# Patient Record
Sex: MALE | Race: BLACK OR AFRICAN AMERICAN | NOT HISPANIC OR LATINO | Employment: OTHER | URBAN - METROPOLITAN AREA
[De-identification: names, ages, dates, MRNs, and addresses within clinical notes are randomized per-mention and may not be internally consistent; named-entity substitution may affect disease eponyms.]

---

## 2019-01-29 ENCOUNTER — HOSPITAL ENCOUNTER (INPATIENT)
Facility: HOSPITAL | Age: 84
LOS: 2 days | Discharge: HOME | DRG: 291 | End: 2019-01-31
Attending: EMERGENCY MEDICINE | Admitting: EMERGENCY MEDICINE
Payer: MEDICARE

## 2019-01-29 DIAGNOSIS — I50.41 ACUTE COMBINED SYSTOLIC AND DIASTOLIC CONGESTIVE HEART FAILURE: ICD-10-CM

## 2019-01-29 DIAGNOSIS — I50.9 CHF (CONGESTIVE HEART FAILURE): ICD-10-CM

## 2019-01-29 DIAGNOSIS — R50.9 FEVER: ICD-10-CM

## 2019-01-29 DIAGNOSIS — R09.02 HYPOXIA: ICD-10-CM

## 2019-01-29 DIAGNOSIS — J18.9 PNEUMONIA OF BOTH LOWER LOBES DUE TO INFECTIOUS ORGANISM: ICD-10-CM

## 2019-01-29 DIAGNOSIS — I50.9 ACUTE CONGESTIVE HEART FAILURE, UNSPECIFIED HEART FAILURE TYPE: Primary | ICD-10-CM

## 2019-01-29 DIAGNOSIS — R06.02 SHORTNESS OF BREATH: ICD-10-CM

## 2019-01-29 DIAGNOSIS — J10.1 INFLUENZA A: ICD-10-CM

## 2019-01-29 PROBLEM — N40.0 BPH (BENIGN PROSTATIC HYPERPLASIA): Status: ACTIVE | Noted: 2019-01-29

## 2019-01-29 PROBLEM — J96.01 ACUTE RESPIRATORY FAILURE WITH HYPOXIA: Status: ACTIVE | Noted: 2019-01-29

## 2019-01-29 PROBLEM — I10 ESSENTIAL HYPERTENSION: Status: ACTIVE | Noted: 2019-01-29

## 2019-01-29 PROBLEM — N18.4 CKD (CHRONIC KIDNEY DISEASE) STAGE 4, GFR 15-29 ML/MIN: Status: ACTIVE | Noted: 2019-01-29

## 2019-01-29 PROBLEM — R33.9 URINARY RETENTION: Status: ACTIVE | Noted: 2019-01-29

## 2019-01-29 LAB
ALBUMIN SERPL BCP-MCNC: 3.4 G/DL
ALP SERPL-CCNC: 79 U/L
ALT SERPL W/O P-5'-P-CCNC: 11 U/L
ANION GAP SERPL CALC-SCNC: 11 MMOL/L
AST SERPL-CCNC: 34 U/L
BACTERIA #/AREA URNS HPF: NORMAL /HPF
BASOPHILS # BLD AUTO: 0.02 K/UL
BASOPHILS NFR BLD: 0.3 %
BILIRUB SERPL-MCNC: 1.3 MG/DL
BILIRUB UR QL STRIP: NEGATIVE
BNP SERPL-MCNC: 1101 PG/ML
BUN SERPL-MCNC: 21 MG/DL
CALCIUM SERPL-MCNC: 9.2 MG/DL
CHLORIDE SERPL-SCNC: 108 MMOL/L
CLARITY UR: CLEAR
CO2 SERPL-SCNC: 21 MMOL/L
COLOR UR: YELLOW
CREAT SERPL-MCNC: 2.4 MG/DL
DIFFERENTIAL METHOD: ABNORMAL
EOSINOPHIL # BLD AUTO: 0 K/UL
EOSINOPHIL NFR BLD: 0.3 %
ERYTHROCYTE [DISTWIDTH] IN BLOOD BY AUTOMATED COUNT: 14.1 %
EST. GFR  (AFRICAN AMERICAN): 28 ML/MIN/1.73 M^2
EST. GFR  (NON AFRICAN AMERICAN): 24 ML/MIN/1.73 M^2
GLUCOSE SERPL-MCNC: 205 MG/DL
GLUCOSE UR QL STRIP: NEGATIVE
HCT VFR BLD AUTO: 30.3 %
HGB BLD-MCNC: 10.3 G/DL
HGB UR QL STRIP: ABNORMAL
HYALINE CASTS #/AREA URNS LPF: 0 /LPF
INFLUENZA A, MOLECULAR: POSITIVE
INFLUENZA B, MOLECULAR: NEGATIVE
KETONES UR QL STRIP: NEGATIVE
LACTATE SERPL-SCNC: 0.8 MMOL/L
LACTATE SERPL-SCNC: 2.3 MMOL/L
LEUKOCYTE ESTERASE UR QL STRIP: NEGATIVE
LYMPHOCYTES # BLD AUTO: 0.5 K/UL
LYMPHOCYTES NFR BLD: 6.9 %
MCH RBC QN AUTO: 29.6 PG
MCHC RBC AUTO-ENTMCNC: 34 G/DL
MCV RBC AUTO: 87 FL
MICROSCOPIC COMMENT: NORMAL
MONOCYTES # BLD AUTO: 1.2 K/UL
MONOCYTES NFR BLD: 15.1 %
NEUTROPHILS # BLD AUTO: 6.1 K/UL
NEUTROPHILS NFR BLD: 77.4 %
NITRITE UR QL STRIP: NEGATIVE
PH UR STRIP: 6 [PH] (ref 5–8)
PLATELET # BLD AUTO: 183 K/UL
PMV BLD AUTO: 10.1 FL
POTASSIUM SERPL-SCNC: 4.1 MMOL/L
PROT SERPL-MCNC: 7.2 G/DL
PROT UR QL STRIP: ABNORMAL
RBC # BLD AUTO: 3.48 M/UL
RBC #/AREA URNS HPF: 1 /HPF (ref 0–4)
SODIUM SERPL-SCNC: 140 MMOL/L
SP GR UR STRIP: 1.01 (ref 1–1.03)
SPECIMEN SOURCE: ABNORMAL
SQUAMOUS #/AREA URNS HPF: 1 /HPF
URN SPEC COLLECT METH UR: ABNORMAL
UROBILINOGEN UR STRIP-ACNC: NEGATIVE EU/DL
WBC # BLD AUTO: 7.84 K/UL
WBC #/AREA URNS HPF: 1 /HPF (ref 0–5)

## 2019-01-29 PROCEDURE — 25000003 PHARM REV CODE 250: Performed by: PHYSICIAN ASSISTANT

## 2019-01-29 PROCEDURE — 96361 HYDRATE IV INFUSION ADD-ON: CPT

## 2019-01-29 PROCEDURE — 87040 BLOOD CULTURE FOR BACTERIA: CPT

## 2019-01-29 PROCEDURE — 96365 THER/PROPH/DIAG IV INF INIT: CPT

## 2019-01-29 PROCEDURE — 80053 COMPREHEN METABOLIC PANEL: CPT

## 2019-01-29 PROCEDURE — 63600175 PHARM REV CODE 636 W HCPCS: Performed by: NURSE PRACTITIONER

## 2019-01-29 PROCEDURE — 25000242 PHARM REV CODE 250 ALT 637 W/ HCPCS: Performed by: NURSE PRACTITIONER

## 2019-01-29 PROCEDURE — 96375 TX/PRO/DX INJ NEW DRUG ADDON: CPT

## 2019-01-29 PROCEDURE — 87502 INFLUENZA DNA AMP PROBE: CPT

## 2019-01-29 PROCEDURE — 83880 ASSAY OF NATRIURETIC PEPTIDE: CPT

## 2019-01-29 PROCEDURE — 25000003 PHARM REV CODE 250: Performed by: NURSE PRACTITIONER

## 2019-01-29 PROCEDURE — 94640 AIRWAY INHALATION TREATMENT: CPT

## 2019-01-29 PROCEDURE — 81000 URINALYSIS NONAUTO W/SCOPE: CPT

## 2019-01-29 PROCEDURE — 63600175 PHARM REV CODE 636 W HCPCS: Performed by: EMERGENCY MEDICINE

## 2019-01-29 PROCEDURE — 21400001 HC TELEMETRY ROOM

## 2019-01-29 PROCEDURE — 85025 COMPLETE CBC W/AUTO DIFF WBC: CPT

## 2019-01-29 PROCEDURE — 83605 ASSAY OF LACTIC ACID: CPT | Mod: 91

## 2019-01-29 PROCEDURE — 99291 CRITICAL CARE FIRST HOUR: CPT | Mod: 25

## 2019-01-29 PROCEDURE — 93010 EKG 12-LEAD: ICD-10-PCS | Mod: ,,, | Performed by: INTERNAL MEDICINE

## 2019-01-29 PROCEDURE — 36415 COLL VENOUS BLD VENIPUNCTURE: CPT

## 2019-01-29 PROCEDURE — 51702 INSERT TEMP BLADDER CATH: CPT

## 2019-01-29 PROCEDURE — 93010 ELECTROCARDIOGRAM REPORT: CPT | Mod: ,,, | Performed by: INTERNAL MEDICINE

## 2019-01-29 PROCEDURE — 93005 ELECTROCARDIOGRAM TRACING: CPT

## 2019-01-29 PROCEDURE — 25000003 PHARM REV CODE 250: Performed by: EMERGENCY MEDICINE

## 2019-01-29 RX ORDER — VANCOMYCIN HCL IN 5 % DEXTROSE 1.5G/250ML
1500 PLASTIC BAG, INJECTION (ML) INTRAVENOUS
Status: COMPLETED | OUTPATIENT
Start: 2019-01-29 | End: 2019-01-29

## 2019-01-29 RX ORDER — ONDANSETRON 2 MG/ML
4 INJECTION INTRAMUSCULAR; INTRAVENOUS EVERY 12 HOURS PRN
Status: DISCONTINUED | OUTPATIENT
Start: 2019-01-29 | End: 2019-01-31 | Stop reason: HOSPADM

## 2019-01-29 RX ORDER — TAMSULOSIN HYDROCHLORIDE 0.4 MG/1
0.4 CAPSULE ORAL DAILY
COMMUNITY

## 2019-01-29 RX ORDER — NAPROXEN SODIUM 220 MG/1
81 TABLET, FILM COATED ORAL DAILY
Status: DISCONTINUED | OUTPATIENT
Start: 2019-01-30 | End: 2019-01-31 | Stop reason: HOSPADM

## 2019-01-29 RX ORDER — OSELTAMIVIR PHOSPHATE 6 MG/ML
30 FOR SUSPENSION ORAL 2 TIMES DAILY
Status: DISCONTINUED | OUTPATIENT
Start: 2019-01-29 | End: 2019-01-31 | Stop reason: HOSPADM

## 2019-01-29 RX ORDER — TAMSULOSIN HYDROCHLORIDE 0.4 MG/1
0.4 CAPSULE ORAL 2 TIMES DAILY
Status: DISCONTINUED | OUTPATIENT
Start: 2019-01-29 | End: 2019-01-29

## 2019-01-29 RX ORDER — NAPROXEN SODIUM 220 MG/1
81 TABLET, FILM COATED ORAL NIGHTLY
COMMUNITY

## 2019-01-29 RX ORDER — METOPROLOL SUCCINATE 25 MG/1
25 TABLET, EXTENDED RELEASE ORAL DAILY
COMMUNITY

## 2019-01-29 RX ORDER — ACETAMINOPHEN 325 MG/1
650 TABLET ORAL EVERY 8 HOURS PRN
Status: DISCONTINUED | OUTPATIENT
Start: 2019-01-29 | End: 2019-01-31 | Stop reason: HOSPADM

## 2019-01-29 RX ORDER — METOPROLOL SUCCINATE 25 MG/1
25 TABLET, EXTENDED RELEASE ORAL DAILY
Status: DISCONTINUED | OUTPATIENT
Start: 2019-01-30 | End: 2019-01-31 | Stop reason: HOSPADM

## 2019-01-29 RX ORDER — ACETAMINOPHEN 325 MG/1
650 TABLET ORAL
Status: COMPLETED | OUTPATIENT
Start: 2019-01-29 | End: 2019-01-29

## 2019-01-29 RX ORDER — OLANZAPINE 2.5 MG/1
2.5 TABLET ORAL 2 TIMES DAILY
Status: DISCONTINUED | OUTPATIENT
Start: 2019-01-29 | End: 2019-01-31 | Stop reason: HOSPADM

## 2019-01-29 RX ORDER — OSELTAMIVIR PHOSPHATE 75 MG/1
75 CAPSULE ORAL
Status: COMPLETED | OUTPATIENT
Start: 2019-01-29 | End: 2019-01-29

## 2019-01-29 RX ORDER — OLANZAPINE 2.5 MG/1
2.5 TABLET ORAL NIGHTLY
COMMUNITY

## 2019-01-29 RX ORDER — CITALOPRAM 10 MG/1
10 TABLET ORAL NIGHTLY
COMMUNITY

## 2019-01-29 RX ORDER — IPRATROPIUM BROMIDE AND ALBUTEROL SULFATE 2.5; .5 MG/3ML; MG/3ML
3 SOLUTION RESPIRATORY (INHALATION) EVERY 6 HOURS
Status: DISCONTINUED | OUTPATIENT
Start: 2019-01-29 | End: 2019-01-31 | Stop reason: HOSPADM

## 2019-01-29 RX ORDER — CITALOPRAM 10 MG/1
10 TABLET ORAL DAILY
Status: DISCONTINUED | OUTPATIENT
Start: 2019-01-30 | End: 2019-01-31 | Stop reason: HOSPADM

## 2019-01-29 RX ORDER — SULFAMETHOXAZOLE AND TRIMETHOPRIM 400; 80 MG/1; MG/1
1 TABLET ORAL 2 TIMES DAILY
Status: ON HOLD | COMMUNITY
End: 2019-01-29

## 2019-01-29 RX ORDER — SODIUM CHLORIDE 0.9 % (FLUSH) 0.9 %
5 SYRINGE (ML) INJECTION
Status: DISCONTINUED | OUTPATIENT
Start: 2019-01-29 | End: 2019-01-31 | Stop reason: HOSPADM

## 2019-01-29 RX ORDER — TAMSULOSIN HYDROCHLORIDE 0.4 MG/1
0.4 CAPSULE ORAL DAILY
Status: DISCONTINUED | OUTPATIENT
Start: 2019-01-30 | End: 2019-01-31 | Stop reason: HOSPADM

## 2019-01-29 RX ORDER — FUROSEMIDE 10 MG/ML
40 INJECTION INTRAMUSCULAR; INTRAVENOUS 2 TIMES DAILY
Status: DISCONTINUED | OUTPATIENT
Start: 2019-01-29 | End: 2019-01-31

## 2019-01-29 RX ORDER — AMLODIPINE BESYLATE 10 MG/1
10 TABLET ORAL DAILY
Status: DISCONTINUED | OUTPATIENT
Start: 2019-01-30 | End: 2019-01-31 | Stop reason: HOSPADM

## 2019-01-29 RX ORDER — AMLODIPINE BESYLATE 10 MG/1
10 TABLET ORAL DAILY
COMMUNITY

## 2019-01-29 RX ADMIN — SODIUM CHLORIDE 2478 ML: 0.9 INJECTION, SOLUTION INTRAVENOUS at 03:01

## 2019-01-29 RX ADMIN — IPRATROPIUM BROMIDE AND ALBUTEROL SULFATE 3 ML: .5; 3 SOLUTION RESPIRATORY (INHALATION) at 07:01

## 2019-01-29 RX ADMIN — FUROSEMIDE 40 MG: 10 INJECTION, SOLUTION INTRAVENOUS at 07:01

## 2019-01-29 RX ADMIN — VANCOMYCIN HYDROCHLORIDE 1500 MG: 10 INJECTION, POWDER, LYOPHILIZED, FOR SOLUTION INTRAVENOUS at 04:01

## 2019-01-29 RX ADMIN — PIPERACILLIN AND TAZOBACTAM 4.5 G: 4; .5 INJECTION, POWDER, LYOPHILIZED, FOR SOLUTION INTRAVENOUS; PARENTERAL at 03:01

## 2019-01-29 RX ADMIN — OSELTAMIVIR PHOSPHATE 30 MG: 6 POWDER, FOR SUSPENSION ORAL at 09:01

## 2019-01-29 RX ADMIN — ACETAMINOPHEN 650 MG: 325 TABLET ORAL at 04:01

## 2019-01-29 RX ADMIN — OSELTAMIVIR PHOSPHATE 75 MG: 75 CAPSULE ORAL at 04:01

## 2019-01-29 RX ADMIN — OLANZAPINE 2.5 MG: 2.5 TABLET, FILM COATED ORAL at 09:01

## 2019-01-29 NOTE — ED PROVIDER NOTES
SCRIBE #1 NOTE: I, Dorcas Jenkins, am scribing for, and in the presence of, Grey Murray MD. I have scribed the entire note.       History     Chief Complaint   Patient presents with    Shortness of Breath     Review of patient's allergies indicates:  No Known Allergies      History of Present Illness     HPI    1/29/2019, 2:34 PM  History obtained from the patient      History of Present Illness: Tye Qui is a 83 y.o. male patient with a PMHx of CHF and HTN who presents to the Emergency Department for evaluation of SOB which onset gradually weeks ago. Symptoms are constant and moderate in severity. No mitigating or exacerbating factors reported. Associated sxs include fever (Tnow 102.4), which onset last night. Patient denies any chills, n/v/d, abd pain, CP, leg swelling, palpitations, weakness, numbness, dizziness, and all other sxs at this time. Pt was last given Tylenol 12 hours ago. No further complaints or concerns at this time.       Arrival mode: Personal vehicle     PCP: PERRI Vee MD        Past Medical History:  History reviewed. No pertinent medical history.    Past Surgical History:  History reviewed. No pertinent surgical history.    Family History:  History reviewed. No pertinent family history.    Social History:  Social History     Tobacco Use    Smoking status: Unknown   Substance and Sexual Activity    Alcohol use: Unknown    Drug use: Unknown    Sexual activity: Unknown        Review of Systems     Review of Systems   Constitutional: Positive for fever (Tnow 102.4). Negative for chills and diaphoresis.   HENT: Negative for congestion, rhinorrhea and sore throat.    Respiratory: Positive for shortness of breath. Negative for cough and choking.    Cardiovascular: Negative for chest pain, palpitations and leg swelling.   Gastrointestinal: Negative for abdominal pain, diarrhea, nausea and vomiting.   Genitourinary: Negative for dysuria, frequency and hematuria.   Musculoskeletal:  Negative for back pain and neck pain.   Skin: Negative for rash.   Neurological: Negative for dizziness, weakness, numbness and headaches.   Hematological: Does not bruise/bleed easily.   All other systems reviewed and are negative.       Physical Exam     Initial Vitals [01/29/19 1348]   BP Pulse Resp Temp SpO2   (!) 178/69 82 20 (!) 102.4 °F (39.1 °C) (!) 94 %      MAP       --          Physical Exam  Nursing Notes and Vital Signs Reviewed.  Constitutional: Patient is in mild distress. Elderly.  Head: Atraumatic. Normocephalic.  Eyes: PERRL. EOM intact. Conjunctivae are not pale. No scleral icterus.  ENT: Mucous membranes are moist. Oropharynx is clear and symmetric.    Neck: Supple. Full ROM. No lymphadenopathy.  Cardiovascular: Regular rate. Regular rhythm. No murmurs, rubs, or gallops. Distal pulses are 2+ and symmetric.  Pulmonary/Chest: Mild respiratory distress. Clear to auscultation bilaterally. No wheezing or rales.  Abdominal: Soft and non-distended.  There is no tenderness.  No rebound, guarding, or rigidity.   Musculoskeletal: Moves all extremities. No obvious deformities. No edema. No calf tenderness.  Skin: Warm and dry.  Neurological:  Alert, awake, and appropriate.  Normal speech.  No acute focal neurological deficits are appreciated.  Psychiatric: Normal affect. Good eye contact. Appropriate in content.     ED Course   Critical Care  Date/Time: 1/29/2019 3:58 PM  Performed by: Grey Murray MD  Authorized by: Grey Murray MD   Direct patient critical care time: 15 minutes  Ordering / reviewing critical care time: 15 minutes  Documentation critical care time: 15 minutes  Consulting other physicians critical care time: 15 minutes  Total critical care time (exclusive of procedural time) : 60 minutes  Critical care time was exclusive of separately billable procedures and treating other patients and teaching time.  Critical care was necessary to treat or prevent imminent or life-threatening  deterioration of the following conditions: hypoxia.  Critical care was time spent personally by me on the following activities: blood draw for specimens, interpretation of cardiac output measurements, evaluation of patient's response to treatment, development of treatment plan with patient or surrogate, examination of patient, obtaining history from patient or surrogate, ordering and performing treatments and interventions, ordering and review of laboratory studies, ordering and review of radiographic studies, pulse oximetry, re-evaluation of patient's condition and review of old charts.        ED Vital Signs:  Vitals:    01/29/19 1348 01/29/19 1442 01/29/19 1500 01/29/19 1531   BP: (!) 178/69  (!) 160/74 (!) 185/79   Pulse: 82  82 90   Resp: 20  20    Temp: (!) 102.4 °F (39.1 °C)      TempSrc: Tympanic      SpO2: (!) 94%  (!) 92% (!) 88%   Weight:  82.6 kg (182 lb 3.2 oz)     Height: 6' (1.829 m)       01/29/19 1600   BP: (!) 165/70   Pulse: 92   Resp: 18   Temp:    TempSrc:    SpO2: (!) 92%   Weight:    Height:        Abnormal Lab Results:  Labs Reviewed   INFLUENZA A & B BY MOLECULAR - Abnormal; Notable for the following components:       Result Value    Influenza A, Molecular Positive (*)     All other components within normal limits   CBC W/ AUTO DIFFERENTIAL - Abnormal; Notable for the following components:    RBC 3.48 (*)     Hemoglobin 10.3 (*)     Hematocrit 30.3 (*)     Lymph # 0.5 (*)     Mono # 1.2 (*)     Gran% 77.4 (*)     Lymph% 6.9 (*)     Mono% 15.1 (*)     All other components within normal limits   COMPREHENSIVE METABOLIC PANEL - Abnormal; Notable for the following components:    CO2 21 (*)     Glucose 205 (*)     Creatinine 2.4 (*)     Albumin 3.4 (*)     Total Bilirubin 1.3 (*)     eGFR if  28 (*)     eGFR if non  24 (*)     All other components within normal limits   LACTIC ACID, PLASMA - Abnormal; Notable for the following components:    Lactate (Lactic Acid)  2.3 (*)     All other components within normal limits   CULTURE, BLOOD   CULTURE, BLOOD   B-TYPE NATRIURETIC PEPTIDE   URINALYSIS, REFLEX TO URINE CULTURE   LACTIC ACID, PLASMA        All Lab Results:  Results for orders placed or performed during the hospital encounter of 01/29/19   Influenza A & B by Molecular   Result Value Ref Range    Influenza A, Molecular Positive (A) Negative    Influenza B, Molecular Negative Negative    Flu A & B Source NP    CBC auto differential   Result Value Ref Range    WBC 7.84 3.90 - 12.70 K/uL    RBC 3.48 (L) 4.60 - 6.20 M/uL    Hemoglobin 10.3 (L) 14.0 - 18.0 g/dL    Hematocrit 30.3 (L) 40.0 - 54.0 %    MCV 87 82 - 98 fL    MCH 29.6 27.0 - 31.0 pg    MCHC 34.0 32.0 - 36.0 g/dL    RDW 14.1 11.5 - 14.5 %    Platelets 183 150 - 350 K/uL    MPV 10.1 9.2 - 12.9 fL    Gran # (ANC) 6.1 1.8 - 7.7 K/uL    Lymph # 0.5 (L) 1.0 - 4.8 K/uL    Mono # 1.2 (H) 0.3 - 1.0 K/uL    Eos # 0.0 0.0 - 0.5 K/uL    Baso # 0.02 0.00 - 0.20 K/uL    Gran% 77.4 (H) 38.0 - 73.0 %    Lymph% 6.9 (L) 18.0 - 48.0 %    Mono% 15.1 (H) 4.0 - 15.0 %    Eosinophil% 0.3 0.0 - 8.0 %    Basophil% 0.3 0.0 - 1.9 %    Differential Method Automated    Comprehensive metabolic panel   Result Value Ref Range    Sodium 140 136 - 145 mmol/L    Potassium 4.1 3.5 - 5.1 mmol/L    Chloride 108 95 - 110 mmol/L    CO2 21 (L) 23 - 29 mmol/L    Glucose 205 (H) 70 - 110 mg/dL    BUN, Bld 21 8 - 23 mg/dL    Creatinine 2.4 (H) 0.5 - 1.4 mg/dL    Calcium 9.2 8.7 - 10.5 mg/dL    Total Protein 7.2 6.0 - 8.4 g/dL    Albumin 3.4 (L) 3.5 - 5.2 g/dL    Total Bilirubin 1.3 (H) 0.1 - 1.0 mg/dL    Alkaline Phosphatase 79 55 - 135 U/L    AST 34 10 - 40 U/L    ALT 11 10 - 44 U/L    Anion Gap 11 8 - 16 mmol/L    eGFR if African American 28 (A) >60 mL/min/1.73 m^2    eGFR if non African American 24 (A) >60 mL/min/1.73 m^2   Lactic acid, plasma #1   Result Value Ref Range    Lactate (Lactic Acid) 2.3 (H) 0.5 - 2.2 mmol/L       Imaging Results:  Imaging  Results          X-Ray Chest AP Portable (Final result)  Result time 01/29/19 15:24:11    Final result by Barry Allen MD (01/29/19 15:24:11)                 Impression:      Interstitial opacities are seen scattered throughout the right mid lower lung zone as well as left lower lobe which could reflect interstitial edema or pneumonia.      Electronically signed by: Barry Allen MD  Date:    01/29/2019  Time:    15:24             Narrative:    EXAMINATION:  XR CHEST AP PORTABLE    CLINICAL HISTORY:  Sepsis;    TECHNIQUE:  Single frontal view of the chest was performed.    COMPARISON:  None    FINDINGS:  Enlargement of the cardiac silhouette could reflect chamber enlargement or pericardial effusion.  Aorta demonstrates atherosclerotic disease.  Interstitial opacities are seen scattered throughout the right mid lower lung zone as well as left lower lobe which could reflect interstitial edema or pneumonia.    Trace bilateral pleural effusions are suspected.  No pneumothorax.  Bones demonstrate advanced degenerative changes.                                 The EKG was ordered, reviewed, and independently interpreted by the ED provider.  Interpretation time: 14:35  Rate: 83 BPM  Rhythm: sinus rhythm with 1st degree AV block  Interpretation: LAD. Septal infarct. No STEMI.             The Emergency Provider reviewed the vital signs and test results, which are outlined above.     ED Discussion     3:35 PM: Re-evaluated pt. I have discussed test results, shared treatment plan, and the need for admission with patient and family at bedside. Pt and family express understanding at this time and agree with all information. All questions answered. Pt and family have no further questions or concerns at this time. Pt is ready for admit.    3:54 PM: Discussed case with SYL Real ([facility]). Dr. Jang agrees with current care and management of pt and accepts admission.   Admitting Service: Hospital medicine    Admitting Physician: Dr. Jang  Admit to: Tele      ED Medication(s):  Medications   vancomycin 1.5 g in 5 % dextrose 250 mL IVPB (not administered)   acetaminophen tablet 650 mg (not administered)   oseltamivir 6 mg/mL 30 mg (not administered)   oseltamivir capsule 75 mg (not administered)   sodium chloride 0.9% flush 5 mL (not administered)   ondansetron injection 4 mg (not administered)   promethazine (PHENERGAN) 6.25 mg in dextrose 5 % 50 mL IVPB (not administered)   acetaminophen tablet 650 mg (not administered)   sodium chloride 0.9% bolus 2,478 mL (2,478 mLs Intravenous New Bag 1/29/19 1514)   piperacillin-tazobactam 4.5 g in dextrose 5 % 100 mL IVPB (ready to mix system) (0 g Intravenous Stopped 1/29/19 1603)                 Medical Decision Making:   Clinical Tests:   Lab Tests: Reviewed and Ordered  Radiological Study: Ordered and Reviewed  Medical Tests: Reviewed and Ordered             Scribe Attestation:   Scribe #1: I performed the above scribed service and the documentation accurately describes the services I performed. I attest to the accuracy of the note.     Attending:   Physician Attestation Statement for Scribe #1: I, Grey Murray MD, personally performed the services described in this documentation, as scribed by Dorcas Jenkins, in my presence, and it is both accurate and complete.           Clinical Impression       ICD-10-CM ICD-9-CM   1. Pneumonia of both lower lobes due to infectious organism J18.1 483.8   2. Shortness of breath R06.02 786.05   3. Fever R50.9 780.60   4. Influenza A J10.1 487.1   5. Hypoxia R09.02 799.02       Disposition:   Disposition: Admitted  Condition: Fair         Grey Murray MD  01/29/19 1611

## 2019-01-29 NOTE — PROGRESS NOTES
Pharmacist Renal Dose Adjustment Note    Tye Qiu is an 83 y.o. male being treated with the medication oseltamivir (Tamiflu) for treatment of influenza A.    Patient Data:    Vital Signs (Most Recent):  Temp: (!) 102.4 °F (39.1 °C) (01/29/19 1348)  Pulse: 90 (01/29/19 1531)  Resp: 20 (01/29/19 1500)  BP: (!) 185/79 (01/29/19 1531)  SpO2: (!) 88 % (01/29/19 1531)   Vital Signs (72h Range):  Temp:  [102.4 °F (39.1 °C)]   Pulse:  [82-90]   Resp:  [20]   BP: (160-185)/(69-79)   SpO2:  [88 %-94 %]      Recent Labs   Lab 01/29/19  1508   CREATININE 2.4*     Serum creatinine: 2.4 mg/dL (H) 01/29/19 1508  Estimated creatinine clearance: 25.6 mL/min (A)    Tamiflu 75 mg PO BID will be decreased to 30 mg PO BID per protocol for CrCl < 60 ml/min.    Pharmacist's Name: Katherine E Mcardle  Pharmacist's Extension: [phone]

## 2019-01-29 NOTE — HPI
Mr Qiu is a 83 year old female with PMHx of HTN, BPH and urinary retention who presented to the Emergency Room with complaints of shortness of breath that started about 4 days ago. Associated symptoms include fever, chills and chest congestion. Denies associated symptoms of chest pain, palpitations, diaphoresis, LOC or dizziness. O 2 sat 88 % on room air in the Emergency Room. Positive for influenza A. Temp 102.4. BNP 1011. Chest X ray showes interstitial opacities are seen scattered throughout the right mid lower lung zone as well as left lower lobe which could reflect interstitial edema or pneumonia. Wife reports he get In & out cath twice daily for urinary retention.  Patient is a full code, wife, Soha Qiu, is surrogate decision maker. He is being admitted under the care of hospital medicine.

## 2019-01-30 LAB
ALBUMIN SERPL BCP-MCNC: 3 G/DL
ALP SERPL-CCNC: 69 U/L
ALT SERPL W/O P-5'-P-CCNC: 13 U/L
ANION GAP SERPL CALC-SCNC: 12 MMOL/L
AST SERPL-CCNC: 35 U/L
BASOPHILS # BLD AUTO: 0.02 K/UL
BASOPHILS NFR BLD: 0.3 %
BILIRUB SERPL-MCNC: 1.2 MG/DL
BUN SERPL-MCNC: 24 MG/DL
CALCIUM SERPL-MCNC: 9 MG/DL
CHLORIDE SERPL-SCNC: 106 MMOL/L
CO2 SERPL-SCNC: 22 MMOL/L
CREAT SERPL-MCNC: 2.3 MG/DL
DIASTOLIC DYSFUNCTION: NO
DIFFERENTIAL METHOD: ABNORMAL
EOSINOPHIL # BLD AUTO: 0 K/UL
EOSINOPHIL NFR BLD: 0.1 %
ERYTHROCYTE [DISTWIDTH] IN BLOOD BY AUTOMATED COUNT: 14.1 %
EST. GFR  (AFRICAN AMERICAN): 29 ML/MIN/1.73 M^2
EST. GFR  (NON AFRICAN AMERICAN): 25 ML/MIN/1.73 M^2
ESTIMATED PA SYSTOLIC PRESSURE: 24.9
GLUCOSE SERPL-MCNC: 145 MG/DL
HCT VFR BLD AUTO: 30.6 %
HGB BLD-MCNC: 10.4 G/DL
LYMPHOCYTES # BLD AUTO: 0.6 K/UL
LYMPHOCYTES NFR BLD: 8.6 %
MCH RBC QN AUTO: 29.5 PG
MCHC RBC AUTO-ENTMCNC: 34 G/DL
MCV RBC AUTO: 87 FL
MITRAL VALVE REGURGITATION: NORMAL
MONOCYTES # BLD AUTO: 1.1 K/UL
MONOCYTES NFR BLD: 14.8 %
NEUTROPHILS # BLD AUTO: 5.6 K/UL
NEUTROPHILS NFR BLD: 76.2 %
PLATELET # BLD AUTO: 165 K/UL
PMV BLD AUTO: 9.7 FL
POTASSIUM SERPL-SCNC: 3.6 MMOL/L
PROT SERPL-MCNC: 6.8 G/DL
RBC # BLD AUTO: 3.53 M/UL
RETIRED EF AND QEF - SEE NOTES: 60 (ref 55–65)
SODIUM SERPL-SCNC: 140 MMOL/L
TRICUSPID VALVE REGURGITATION: NORMAL
WBC # BLD AUTO: 7.35 K/UL

## 2019-01-30 PROCEDURE — 85025 COMPLETE CBC W/AUTO DIFF WBC: CPT

## 2019-01-30 PROCEDURE — 27000221 HC OXYGEN, UP TO 24 HOURS

## 2019-01-30 PROCEDURE — 63600175 PHARM REV CODE 636 W HCPCS: Performed by: EMERGENCY MEDICINE

## 2019-01-30 PROCEDURE — 97165 OT EVAL LOW COMPLEX 30 MIN: CPT

## 2019-01-30 PROCEDURE — 80053 COMPREHEN METABOLIC PANEL: CPT

## 2019-01-30 PROCEDURE — 94640 AIRWAY INHALATION TREATMENT: CPT

## 2019-01-30 PROCEDURE — 93306 2D ECHO WITH COLOR FLOW DOPPLER: ICD-10-PCS | Mod: 26,,, | Performed by: INTERNAL MEDICINE

## 2019-01-30 PROCEDURE — 25000003 PHARM REV CODE 250: Performed by: NURSE PRACTITIONER

## 2019-01-30 PROCEDURE — 36415 COLL VENOUS BLD VENIPUNCTURE: CPT

## 2019-01-30 PROCEDURE — 93306 TTE W/DOPPLER COMPLETE: CPT

## 2019-01-30 PROCEDURE — 25000003 PHARM REV CODE 250: Performed by: EMERGENCY MEDICINE

## 2019-01-30 PROCEDURE — 63600175 PHARM REV CODE 636 W HCPCS: Performed by: NURSE PRACTITIONER

## 2019-01-30 PROCEDURE — 97530 THERAPEUTIC ACTIVITIES: CPT

## 2019-01-30 PROCEDURE — 25000003 PHARM REV CODE 250: Performed by: PHYSICIAN ASSISTANT

## 2019-01-30 PROCEDURE — 25000242 PHARM REV CODE 250 ALT 637 W/ HCPCS: Performed by: NURSE PRACTITIONER

## 2019-01-30 PROCEDURE — 94761 N-INVAS EAR/PLS OXIMETRY MLT: CPT

## 2019-01-30 PROCEDURE — 93306 TTE W/DOPPLER COMPLETE: CPT | Mod: 26,,, | Performed by: INTERNAL MEDICINE

## 2019-01-30 PROCEDURE — 97161 PT EVAL LOW COMPLEX 20 MIN: CPT

## 2019-01-30 PROCEDURE — 97116 GAIT TRAINING THERAPY: CPT

## 2019-01-30 PROCEDURE — 21400001 HC TELEMETRY ROOM

## 2019-01-30 RX ORDER — PREDNISONE 20 MG/1
20 TABLET ORAL 2 TIMES DAILY
Status: DISCONTINUED | OUTPATIENT
Start: 2019-01-30 | End: 2019-01-31 | Stop reason: HOSPADM

## 2019-01-30 RX ADMIN — IPRATROPIUM BROMIDE AND ALBUTEROL SULFATE 3 ML: .5; 3 SOLUTION RESPIRATORY (INHALATION) at 08:01

## 2019-01-30 RX ADMIN — ACETAMINOPHEN 650 MG: 325 TABLET, FILM COATED ORAL at 12:01

## 2019-01-30 RX ADMIN — ASPIRIN 81 MG CHEWABLE TABLET 81 MG: 81 TABLET CHEWABLE at 09:01

## 2019-01-30 RX ADMIN — CITALOPRAM HYDROBROMIDE 10 MG: 10 TABLET ORAL at 09:01

## 2019-01-30 RX ADMIN — OLANZAPINE 2.5 MG: 2.5 TABLET, FILM COATED ORAL at 09:01

## 2019-01-30 RX ADMIN — IPRATROPIUM BROMIDE AND ALBUTEROL SULFATE 3 ML: .5; 3 SOLUTION RESPIRATORY (INHALATION) at 01:01

## 2019-01-30 RX ADMIN — TAMSULOSIN HYDROCHLORIDE 0.4 MG: 0.4 CAPSULE ORAL at 09:01

## 2019-01-30 RX ADMIN — IPRATROPIUM BROMIDE AND ALBUTEROL SULFATE 3 ML: .5; 3 SOLUTION RESPIRATORY (INHALATION) at 12:01

## 2019-01-30 RX ADMIN — OSELTAMIVIR PHOSPHATE 30 MG: 6 POWDER, FOR SUSPENSION ORAL at 09:01

## 2019-01-30 RX ADMIN — METOPROLOL SUCCINATE 25 MG: 25 TABLET, EXTENDED RELEASE ORAL at 09:01

## 2019-01-30 RX ADMIN — OLANZAPINE 2.5 MG: 2.5 TABLET, FILM COATED ORAL at 08:01

## 2019-01-30 RX ADMIN — OSELTAMIVIR PHOSPHATE 30 MG: 6 POWDER, FOR SUSPENSION ORAL at 08:01

## 2019-01-30 RX ADMIN — AMLODIPINE BESYLATE 10 MG: 10 TABLET ORAL at 09:01

## 2019-01-30 RX ADMIN — PREDNISONE 20 MG: 20 TABLET ORAL at 08:01

## 2019-01-30 RX ADMIN — FUROSEMIDE 40 MG: 10 INJECTION, SOLUTION INTRAVENOUS at 06:01

## 2019-01-30 RX ADMIN — FUROSEMIDE 40 MG: 10 INJECTION, SOLUTION INTRAVENOUS at 09:01

## 2019-01-30 NOTE — PROGRESS NOTES
"Pt admitted to floor. Alert and oriented x 3. Pt quiet. Not talking much. Pt wife states," pt is stubborn and does not talk much, he is mad that he is here." instructed pt that he must answer questions to get an accurate assessment. Pt denies chest pain. Heart with rrr. Pulses palpable. Denies sob at this time. Sob noted. o2 at 2l nc. Stat =95%.lungs coarse. With inspr. And exp wheezing. Abdomen soft. States bm today. Simon to gravity. Skin wnl. Rt heel had blanchable redness, elevated. Iv to rt and lt arm. Wnl. Oriented to room and call light. Bed alarm on. Fall precautions. Water, phone and call light in reach. Instructed on 1500ml fluid restriction.  "

## 2019-01-30 NOTE — SUBJECTIVE & OBJECTIVE
Past Medical History:   Diagnosis Date    BPH (benign prostatic hyperplasia)     Hypertension        Past Surgical History:   Procedure Laterality Date    ABDOMINAL SURGERY      BACK SURGERY         Review of patient's allergies indicates:  No Known Allergies    No current facility-administered medications on file prior to encounter.      Current Outpatient Medications on File Prior to Encounter   Medication Sig    amLODIPine (NORVASC) 10 MG tablet Take 10 mg by mouth once daily.    aspirin 81 MG Chew Take 81 mg by mouth once daily.    citalopram (CELEXA) 10 MG tablet Take 10 mg by mouth once daily.    metoprolol succinate (TOPROL-XL) 25 MG 24 hr tablet Take 25 mg by mouth once daily.    OLANZapine (ZYPREXA) 2.5 MG tablet Take 2.5 mg by mouth 2 (two) times daily.    sulfamethoxazole-trimethoprim 400-80mg (BACTRIM) 400-80 mg per tablet Take 1 tablet by mouth 2 (two) times daily.    tamsulosin (FLOMAX) 0.4 mg Cap Take 0.4 mg by mouth once daily.      Family History     Problem Relation (Age of Onset)    Cancer Mother, Brother        Tobacco Use    Smoking status: Former Smoker    Smokeless tobacco: Never Used   Substance and Sexual Activity    Alcohol use: No     Frequency: Never    Drug use: No    Sexual activity: Not on file     Review of Systems   Constitutional: Positive for chills and fatigue. Negative for diaphoresis and fever.   HENT: Positive for congestion. Negative for ear discharge, rhinorrhea, sinus pressure, sore throat and trouble swallowing.    Eyes: Negative for discharge and visual disturbance.   Respiratory: Positive for shortness of breath. Negative for apnea, cough, choking, chest tightness, wheezing and stridor.    Cardiovascular: Negative for chest pain, palpitations and leg swelling.   Gastrointestinal: Negative for abdominal distention, abdominal pain, diarrhea, nausea and vomiting.   Endocrine: Negative for cold intolerance and heat intolerance.   Genitourinary: Negative for  dysuria, frequency and hematuria.        Urine retention    Musculoskeletal: Negative for arthralgias, back pain, myalgias and neck pain.   Skin: Negative for pallor and rash.   Neurological: Positive for weakness. Negative for dizziness, seizures, syncope and headaches.   Psychiatric/Behavioral: Negative for agitation, confusion and sleep disturbance.     Objective:     Vital Signs (Most Recent):  Temp: (!) 102.4 °F (39.1 °C) (01/29/19 1348)  Pulse: 71 (01/29/19 1748)  Resp: 18 (01/29/19 1748)  BP: 139/65 (01/29/19 1748)  SpO2: 95 % (01/29/19 1748) Vital Signs (24h Range):  Temp:  [102.4 °F (39.1 °C)] 102.4 °F (39.1 °C)  Pulse:  [71-92] 71  Resp:  [16-20] 18  SpO2:  [88 %-98 %] 95 %  BP: (124-185)/(56-79) 139/65     Weight: 82.6 kg (182 lb 3.2 oz)  Body mass index is 24.71 kg/m².    Physical Exam   Constitutional: He appears cachectic. He has a sickly appearance. No distress.   HENT:   Head: Normocephalic and atraumatic.   Neck: No JVD present.   Cardiovascular: Exam reveals no gallop and no friction rub.   No murmur heard.  Pulmonary/Chest: No stridor. No respiratory distress. He has no wheezes. He has rhonchi. He has no rales.   Abdominal: He exhibits no distension and no mass. There is no tenderness. There is no rebound and no guarding. No hernia.   Musculoskeletal: He exhibits no edema or deformity.   Neurological: He is alert.   Skin: Skin is dry. Capillary refill takes less than 2 seconds. He is not diaphoretic. No erythema.   Nursing note and vitals reviewed.          Significant Labs:   CBC:   Recent Labs   Lab 01/29/19  1508   WBC 7.84   HGB 10.3*   HCT 30.3*        CMP:   Recent Labs   Lab 01/29/19  1508      K 4.1      CO2 21*   *   BUN 21   CREATININE 2.4*   CALCIUM 9.2   PROT 7.2   ALBUMIN 3.4*   BILITOT 1.3*   ALKPHOS 79   AST 34   ALT 11   ANIONGAP 11   EGFRNONAA 24*     Cardiac Markers:   Recent Labs   Lab 01/29/19  1508   BNP 1,101*     Lactic Acid:   Recent Labs   Lab  01/29/19  1508   LACTATE 2.3*       Significant Imaging:     Imaging Results          X-Ray Chest AP Portable (Final result)  Result time 01/29/19 15:24:11    Final result by Barry Allen MD (01/29/19 15:24:11)                 Impression:      Interstitial opacities are seen scattered throughout the right mid lower lung zone as well as left lower lobe which could reflect interstitial edema or pneumonia.      Electronically signed by: Barry Allen MD  Date:    01/29/2019  Time:    15:24             Narrative:    EXAMINATION:  XR CHEST AP PORTABLE    CLINICAL HISTORY:  Sepsis;    TECHNIQUE:  Single frontal view of the chest was performed.    COMPARISON:  None    FINDINGS:  Enlargement of the cardiac silhouette could reflect chamber enlargement or pericardial effusion.  Aorta demonstrates atherosclerotic disease.  Interstitial opacities are seen scattered throughout the right mid lower lung zone as well as left lower lobe which could reflect interstitial edema or pneumonia.    Trace bilateral pleural effusions are suspected.  No pneumothorax.  Bones demonstrate advanced degenerative changes.

## 2019-01-30 NOTE — H&P
[facility] Hospital Medicine  History & Physical    Patient Name: Tye Qiu  MRN: [id number]  Admission Date: 1/29/2019  Attending Physician: Chidi Jang MD   Primary Care Provider: PERRI Vee MD         Patient information was obtained from patient, spouse/SO and ER records.     Subjective:     Principal Problem:Acute respiratory failure with hypoxia    Chief Complaint:   Chief Complaint   Patient presents with    Shortness of Breath        HPI: Mr Qiu is a 83 year old female with PMHx of HTN, BPH and urinary retention who presented to the Emergency Room with complaints of shortness of breath that started about 4 days ago. Associated symptoms include fever, chills and chest congestion. Denies associated symptoms of chest pain, palpitations, diaphoresis, LOC or dizziness. O 2 sat 88 % on room air in the Emergency Room. Positive for influenza A. Temp 102.4. BNP 1011. Chest X ray showes interstitial opacities are seen scattered throughout the right mid lower lung zone as well as left lower lobe which could reflect interstitial edema or pneumonia. Wife reports he get In & out cath twice daily for urinary retention.  Patient is a full code, wife, Soha Qiu, is surrogate decision maker. He is being admitted under the care of [facility] medicine.     Past Medical History:   Diagnosis Date    BPH (benign prostatic hyperplasia)     Hypertension        Past Surgical History:   Procedure Laterality Date    ABDOMINAL SURGERY      BACK SURGERY         Review of patient's allergies indicates:  No Known Allergies    No current facility-administered medications on file prior to encounter.      Current Outpatient Medications on File Prior to Encounter   Medication Sig    amLODIPine (NORVASC) 10 MG tablet Take 10 mg by mouth once daily.    aspirin 81 MG Chew Take 81 mg by mouth once daily.    citalopram (CELEXA) 10 MG tablet Take 10 mg by mouth once daily.    metoprolol succinate (TOPROL-XL) 25 MG  24 hr tablet Take 25 mg by mouth once daily.    OLANZapine (ZYPREXA) 2.5 MG tablet Take 2.5 mg by mouth 2 (two) times daily.    sulfamethoxazole-trimethoprim 400-80mg (BACTRIM) 400-80 mg per tablet Take 1 tablet by mouth 2 (two) times daily.    tamsulosin (FLOMAX) 0.4 mg Cap Take 0.4 mg by mouth once daily.      Family History     Problem Relation (Age of Onset)    Cancer Mother, Brother        Tobacco Use    Smoking status: Former Smoker    Smokeless tobacco: Never Used   Substance and Sexual Activity    Alcohol use: No     Frequency: Never    Drug use: No    Sexual activity: Not on file     Review of Systems   Constitutional: Positive for chills and fatigue. Negative for diaphoresis and fever.   HENT: Positive for congestion. Negative for ear discharge, rhinorrhea, sinus pressure, sore throat and trouble swallowing.    Eyes: Negative for discharge and visual disturbance.   Respiratory: Positive for shortness of breath. Negative for apnea, cough, choking, chest tightness, wheezing and stridor.    Cardiovascular: Negative for chest pain, palpitations and leg swelling.   Gastrointestinal: Negative for abdominal distention, abdominal pain, diarrhea, nausea and vomiting.   Endocrine: Negative for cold intolerance and heat intolerance.   Genitourinary: Negative for dysuria, frequency and hematuria.        Urine retention    Musculoskeletal: Negative for arthralgias, back pain, myalgias and neck pain.   Skin: Negative for pallor and rash.   Neurological: Positive for weakness. Negative for dizziness, seizures, syncope and headaches.   Psychiatric/Behavioral: Negative for agitation, confusion and sleep disturbance.     Objective:     Vital Signs (Most Recent):  Temp: (!) 102.4 °F (39.1 °C) (01/29/19 1348)  Pulse: 71 (01/29/19 1748)  Resp: 18 (01/29/19 1748)  BP: 139/65 (01/29/19 1748)  SpO2: 95 % (01/29/19 1748) Vital Signs (24h Range):  Temp:  [102.4 °F (39.1 °C)] 102.4 °F (39.1 °C)  Pulse:  [71-92] 71  Resp:   [16-20] 18  SpO2:  [88 %-98 %] 95 %  BP: (124-185)/(56-79) 139/65     Weight: 82.6 kg (182 lb 3.2 oz)  Body mass index is 24.71 kg/m².    Physical Exam   Constitutional: He appears cachectic. He has a sickly appearance. No distress.   HENT:   Head: Normocephalic and atraumatic.   Neck: No JVD present.   Cardiovascular: Exam reveals no gallop and no friction rub.   No murmur heard.  Pulmonary/Chest: No stridor. No respiratory distress. He has no wheezes. He has rhonchi. He has no rales.   Abdominal: He exhibits no distension and no mass. There is no tenderness. There is no rebound and no guarding. No hernia.   Musculoskeletal: He exhibits no edema or deformity.   Neurological: He is alert.   Skin: Skin is dry. Capillary refill takes less than 2 seconds. He is not diaphoretic. No erythema.   Nursing note and vitals reviewed.          Significant Labs:   CBC:   Recent Labs   Lab 01/29/19  1508   WBC 7.84   HGB 10.3*   HCT 30.3*        CMP:   Recent Labs   Lab 01/29/19  1508      K 4.1      CO2 21*   *   BUN 21   CREATININE 2.4*   CALCIUM 9.2   PROT 7.2   ALBUMIN 3.4*   BILITOT 1.3*   ALKPHOS 79   AST 34   ALT 11   ANIONGAP 11   EGFRNONAA 24*     Cardiac Markers:   Recent Labs   Lab 01/29/19  1508   BNP 1,101*     Lactic Acid:   Recent Labs   Lab 01/29/19  1508   LACTATE 2.3*       Significant Imaging:     Imaging Results          X-Ray Chest AP Portable (Final result)  Result time 01/29/19 15:24:11    Final result by Barry Allen MD (01/29/19 15:24:11)                 Impression:      Interstitial opacities are seen scattered throughout the right mid lower lung zone as well as left lower lobe which could reflect interstitial edema or pneumonia.      Electronically signed by: Barry Allen MD  Date:    01/29/2019  Time:    15:24             Narrative:    EXAMINATION:  XR CHEST AP PORTABLE    CLINICAL HISTORY:  Sepsis;    TECHNIQUE:  Single frontal view of the chest was  performed.    COMPARISON:  None    FINDINGS:  Enlargement of the cardiac silhouette could reflect chamber enlargement or pericardial effusion.  Aorta demonstrates atherosclerotic disease.  Interstitial opacities are seen scattered throughout the right mid lower lung zone as well as left lower lobe which could reflect interstitial edema or pneumonia.    Trace bilateral pleural effusions are suspected.  No pneumothorax.  Bones demonstrate advanced degenerative changes.                              Assessment/Plan:     * Acute respiratory failure with hypoxia    Admit to Inpatient   O 2 sats 88 % on RA in the Emergency Room   Supplemental O 2 keeps > 92 %   Duo neb              Acute congestive heart failure    BMP 1101  Lasix 40 mg IV BID   Check 2 D Echo   Low sodium diet   Strict I & O  Daily weights   Fluid restriction        Influenza A    Admit to Inpatient   Tamiflu 75 mg BID   Supplemental O 2, keep sats > 92%   Zosyn and Vancomycin given in the ED   BC X 2   Tylenol as needed for fever   Droplet precautions              CKD (chronic kidney disease) stage 4, GFR 15-29 ml/min    Creatinine 2.4 on admit   No recent labs to compare  Monitor         Urinary retention    Wife reports pt gets In & Out Cath twice daily at home for urinary retention   Simon Cath   UA       BPH (benign prostatic hyperplasia)    Continue Flomax        Essential hypertension    Continue home BP medications          VTE Risk Mitigation (From admission, onward)        Ordered     IP VTE HIGH RISK PATIENT  Once      01/29/19 1601     Place sequential compression device  Until discontinued      01/29/19 1601             Osorio Velazquez NP  Department of Hospital Medicine   [facility]

## 2019-01-30 NOTE — ASSESSMENT & PLAN NOTE
Admit to Inpatient   O 2 sats 88 % on RA in the Emergency Room   Supplemental O 2 keeps > 92 %   Duo neb

## 2019-01-30 NOTE — PLAN OF CARE
Sw met with patient and spouse at bedside to complete assessment. Pt lives with spouse and spouse reports pt is independent. Sw verified pt's address. Patient denies any post hospital needs or services at this time. Transitional Care Folder, Discharge Planning Begins on Admission pamphlet, [facility], Advance Directive information given to patient along with the contact information given. Sw placed contact information on white board. Sw instructed patient or family to call with any questions or concerns.    D/C trans: Spouse  D/C Plan: Home     Pt's pcp is PERRI Vee MD. Pt uses   [id number] - [place], [place] - [address] [facility]  [address]  [place] [address]  Phone: [phone] Fax: [phone]       01/30/19 1037   Discharge Assessment   Assessment Type Discharge Planning Assessment   Confirmed/corrected address and phone number on facesheet? Yes   Assessment information obtained from? Patient;Caregiver   Communicated expected length of stay with patient/caregiver yes   Prior to hospitilization cognitive status: Alert/Oriented   Prior to hospitalization functional status: Independent   Current cognitive status: Alert/Oriented   Current Functional Status: Independent   Facility Arrived From: Home   Lives With spouse   Able to Return to Prior Arrangements yes   Is patient able to care for self after discharge? Unable to determine at this time (comments)   Who are your caregiver(s) and their phone number(s)? Soha Qiu, Spouse, [phone]   Patient's perception of discharge disposition home or selfcare   Readmission Within the Last 30 Days no previous admission in last 30 days   Patient currently being followed by outpatient case management? No   Patient currently receives any other outside agency services? No   Equipment Currently Used at Home none   Do you have any problems affording any of your prescribed medications?  No   Is the patient taking medications as prescribed? yes   Does the patient have transportation home? Yes   Transportation Anticipated family or friend will provide   Does the patient receive services at the [facility]? No   Discharge Plan A Home with family   Discharge Plan B Home with family   DME Needed Upon Discharge  none   Patient/Family in Agreement with Plan yes   Does the patient have transportation to healthcare appointments? Yes

## 2019-01-30 NOTE — PT/OT/SLP EVAL
Physical Therapy Evaluation    Patient Name:  Tye Qiu   MRN:  [id number]    Recommendations:     Discharge Recommendations:  other (see comments)(HH P.T.)   Discharge Equipment Recommendations: none   Barriers to discharge: None    Assessment:     Tye Qiu is a 83 y.o. male admitted with a medical diagnosis of Acute respiratory failure with hypoxia.  He presents with the following impairments/functional limitations:  weakness, impaired endurance, impaired functional mobilty, gait instability, decreased lower extremity function, impaired balance, decreased ROM .    Rehab Prognosis: Good; patient would benefit from acute skilled PT services to address these deficits and reach maximum level of function.    Recent Surgery: * No surgery found *      Plan:     During this hospitalization, patient to be seen   to address the identified rehab impairments via gait training, therapeutic activities, therapeutic exercises and progress toward the following goals:    · Plan of Care Expires:  02/06/19    Subjective     Chief Complaint: WEAKNESS  Patient/Family Comments/goals: NONE STATED  Pain/Comfort:  · Pain Rating 1: 0/10  · Pain Rating Post-Intervention 1: 0/10    Patients cultural, spiritual, Sabianism conflicts given the current situation:      Living Environment:  PT LIVES AT HOME WITH WIFE AND HAS 5 STEPS TO ENTER HOME WITH RAILING. PT LIVES IN A ONE STORY HOUSE   Prior to admission, patients level of function was IND AT HOME AND DRIVES.  Equipment used at home: none.  DME owned (not currently used): none.  Upon discharge, patient will have assistance from WIFE.    Objective:     Communicated with NURSE PHILLIPS AND Epic CHART  prior to session.  Patient found SUP IN BED  peripheral IV, telemetry  upon PT entry to room.    General Precautions: Standard, fall   Orthopedic Precautions:N/A   Braces: N/A     Exams:  · RLE ROM: WFL  · RLE Strength: 4-/5  · LLE ROM: WFL  · LLE Strength: 4-/5    Functional Mobility:  PT  MET IN RM LOG ROLLED AND SEATED EOB WITH S. P.T. COMPLETED MMT AND PT STOOD WITH NO AD AND GT TRAINED X 40' WITH CGA AND UNSTEADY GT. PT RETURNED SEATED EOB AND SUP IN BED WITH S. PT EDUCATED ON ROLE OF P.T. AND LEFT WITH ALL NEEDS MET     AM-PAC 6 CLICK MOBILITY  Total Score:18     Patient left supine with call button in reach.    GOALS:   Multidisciplinary Problems     Physical Therapy Goals        Problem: Physical Therapy Goal    Goal Priority Disciplines Outcome Goal Variances Interventions   Physical Therapy Goal     PT, PT/OT      Description:  PT WILL BE SEEN FOR P.T. FOR A MIN OF 5 OUT OF 7 DAYS A WEEK  LT19  1. PT WILL COMPLETE BED MOBILITY IND  2. PT WILL GT TRAIN X 150' WITH S  3. PT WILL T/F TO CHAIR WITH S  4. PT WILL COMPLETE B LE TE X 20 REPS                    History:     Past Medical History:   Diagnosis Date    BPH (benign prostatic hyperplasia)     Hypertension        Past Surgical History:   Procedure Laterality Date    ABDOMINAL SURGERY      BACK SURGERY      GALLBLADDER SURGERY         Clinical Decision Making:     History  Co-morbidities and personal factors that may impact the plan of care Examination  Body Structures and Functions, activity limitations and participation restrictions that may impact the plan of care Clinical Presentation   Decision Making/ Complexity Score   Co-morbidities:   [] Time since onset of injury / illness / exacerbation  [] Status of current condition  []Patient's cognitive status and safety concerns    [] Multiple Medical Problems (see med hx)  Personal Factors:   [] Patient's age  [] Prior Level of function   [] Patient's home situation (environment and family support)  [] Patient's level of motivation  [] Expected progression of patient      HISTORY:(criteria)    [] 24028 - no personal factors/history    [] 95804 - has 1-2 personal factor/comorbidity     [] 93405 - has >3 personal factor/comorbidity     Body Regions:  [] Objective examination  findings  [] Head     []  Neck  [] Trunk   [] Upper Extremity  [] Lower Extremity    Body Systems:  [] For communication ability, affect, cognition, language, and learning style: the assessment of the ability to make needs known, consciousness, orientation (person, place, and time), expected emotional /behavioral responses, and learning preferences (eg, learning barriers, education  needs)  [] For the neuromuscular system: a general assessment of gross coordinated movement (eg, balance, gait, locomotion, transfers, and transitions) and motor function  (motor control and motor learning)  [] For the musculoskeletal system: the assessment of gross symmetry, gross range of motion, gross strength, height, and weight  [] For the integumentary system: the assessment of pliability(texture), presence of scar formation, skin color, and skin integrity  [] For cardiovascular/pulmonary system: the assessment of heart rate, respiratory rate, blood pressure, and edema     Activity limitations:    [] Patient's cognitive status and saf ety concerns          [] Status of current condition      [] Weight bearing restriction  [] Cardiopulmunary Restriction    Participation Restrictions:   [] Goals and goal agreement with the patient     [] Rehab potential (prognosis) and probable outcome      Examination of Body System: (criteria)    [] 67980 - addressing 1-2 elements    [] 56448 - addressing a total of 3 or more elements     [] 25025 -  Addressing a total of 4 or more elements         Clinical Presentation: (criteria)  Choose one     On examination of body system using standardized tests and measures patient presents with (CHOOSE ONE) elements from any of the following: body structures and functions, activity limitations, and/or participation restrictions.  Leading to a clinical presentation that is considered (CHOOSE ONE)                              Clinical Decision Making  (Eval Complexity):  Choose One     Time Tracking:     PT  Received On: 01/30/19  PT Start Time: 1220     PT Stop Time: 1243  PT Total Time (min): 23 min     Billable Minutes: Evaluation 13 and Gait Training 10      Zuly Muniz, PT  01/30/2019

## 2019-01-30 NOTE — NURSING
Patient blood pressure 178/79. JAMAL Phoenix NP notified. No new orders. No acute distress noted. No complaints at this time.

## 2019-01-30 NOTE — PLAN OF CARE
Problem: Adult Inpatient Plan of Care  Goal: Plan of Care Review  Outcome: Ongoing (interventions implemented as appropriate)  Dx flu. Pneumonia, chf  poc instructed on dbc 10 x q1h wa, instructed on turning q2h. Oriented to room and call light. Phone call ight and water in reach. Instructed on 1500ml fluid restriction. Instructed on fall risk and precautions. Instructed on droplet isolation. Pt not talking much, but does respond appropriately. Keeps eyes closed.

## 2019-01-30 NOTE — PT/OT/SLP EVAL
Occupational Therapy   Evaluation    Name: Tye Qiu  MRN: [id number]  Admitting Diagnosis:  Acute respiratory failure with hypoxia      Recommendations:     Discharge Recommendations: other (see comments)  Discharge Equipment Recommendations:     Barriers to discharge:  None    Assessment:     Tye Qiu is a 83 y.o. male with a medical diagnosis of Acute respiratory failure with hypoxia.  He presents with DEBILITY AND GENERALIZED WEAKNESS. Performance deficits affecting function: weakness, impaired functional mobilty, impaired balance, decreased upper extremity function, decreased safety awareness, impaired self care skills, impaired endurance, gait instability.      Rehab Prognosis: Good; patient would benefit from acute skilled OT services to address these deficits and reach maximum level of function.       Plan:     Patient to be seen 3 x/week to address the above listed problems via self-care/home management, therapeutic activities, therapeutic exercises  · Plan of Care Expires: 02/06/19  · Plan of Care Reviewed with: patient, spouse    Subjective     Chief Complaint: DEBILITY AND GENERALIZED WEAKNESS  Patient/Family Comments/goals:     Occupational Profile:  Living Environment: LIVES SPOUSE IN 1 STORY HOUSE  Previous level of function:(I) WITH ADL'S AND FUNCTIONAL MOBILITY. PT REPORTS STILL DRIVES AND   Roles and Routines: OCCUPATIONAL THERAPY  Equipment Used at Home:  none  Assistance upon Discharge:     Pain/Comfort:  · Pain Rating 1: 0/10    Patients cultural, spiritual, Jehovah's witness conflicts given the current situation:      Objective:     Communicated with: NURSE AND Epic CHART REVIEW prior to session.  Patient found HOB elevated with telemetry, peripheral IV upon OT entry to room.    General Precautions: Standard, fall   Orthopedic Precautions:N/A   Braces: N/A     Occupational Performance:    Bed Mobility:    · Patient completed Rolling/Turning to Left with  minimum assistance  · Patient completed  Rolling/Turning to Right with minimum assistance  · Patient completed Scooting/Bridging with minimum assistance  · Patient completed Supine to Sit with minimum assistance  · Patient completed Sit to Supine with minimum assistance    Functional Mobility/Transfers:  · Patient completed Sit <> Stand Transfer with minimum assistance  with  hand-held assist   · Functional Mobility: MIN A WITH 2 X 40 FEET WITH HAND HELD ASSIST    Activities of Daily Living:  · Upper Body Dressing: minimum assistance .  · Lower Body Dressing: minimum assistance .    Cognitive/Visual Perceptual:  Cognitive/Psychosocial Skills:     -       Oriented to: Person, Place, Time and Situation   -       Follows Commands/attention:Follows multistep  commands  -       Communication: clear/fluent  -       Memory: No Deficits noted  -       Safety awareness/insight to disability: impaired   Visual/Perceptual:      -Intact .    Physical Exam:  Upper Extremity Range of Motion:     -       Right Upper Extremity: AROM APPROX 70 DEGREES  -       Left Upper Extremity: WFL  Upper Extremity Strength:    -       Right Upper Extremity: MMT: 2/5 GROSSLY  -       Left Upper Extremity: MMT: 3/5 GROSSLY   Strength:    -       Right Upper Extremity: WFL  -       Left Upper Extremity: WFL    AMPAC 6 Click ADL:  AMPAC Total Score: 20    Treatment & Education:    Education:    Patient left HOB elevated with all lines intact, call button in reach, NURSE notified and SPOUSE present    GOALS:   Multidisciplinary Problems     Occupational Therapy Goals        Problem: Occupational Therapy Goal    Goal Priority Disciplines Outcome Interventions   Occupational Therapy Goal     OT, PT/OT     Description:  OT GOAL TO BE MET 2-6-19  S WITH UE DRESSING  S WITH LE DRESSING  PT WILL TOLERATE 1 SET X 15 REPS B UE ROM EXERCISE WITH MIN RESISTANCE                      History:     Past Medical History:   Diagnosis Date    BPH (benign prostatic hyperplasia)     Hypertension   "      Past Surgical History:   Procedure Laterality Date    ABDOMINAL SURGERY      BACK SURGERY      GALLBLADDER SURGERY         Clinical Decision Makin.  OT Low:  "Pt evaluation falls under low complexity for evaluation coding due to performance deficits noted in 1-3 areas as stated above and 0 co-morbities affecting current functional status. Data obtained from problem focused assessments. No modifications or assistance was required for completion of evaluation. Only brief occupational profile and history review completed."     Time Tracking:     OT Date of Treatment: 19  OT Start Time: 1125  OT Stop Time: 1150  OT Total Time (min): 25 min    Billable Minutes:Evaluation 10 MINUTES  Therapeutic Activity 15 MINUTES    Lucretia Glasgow OT  2019    "

## 2019-01-30 NOTE — ASSESSMENT & PLAN NOTE
BMP 1101  Lasix 40 mg IV BID   Check 2 D Echo   Low sodium diet   Strict I & O  Daily weights   Fluid restriction

## 2019-01-30 NOTE — NURSING
PCT's Peggy moved pt to 203 at this time. All belongings brought from room 219 to 203 and avasys brought to the room. Called melissa Garcia at this time to verify that they were still able to see patient . Verified that pt was able to be seen on tele sitter. Will continue to monitor.

## 2019-01-30 NOTE — NURSING
Pt was agitated and trying to get out of the bed. I repositioned patient and removed SCD's at this time. Pt did not express why he was wanting to get out of the bed. Pt does have avasys  in the room to prevent falls, Pt wife at the bedside but pt seems to be more irritated when he wife tries to re direct pt . Will move pt to room 203 from 219 to be able to get to pt quicker when he is trying to get out of the bed.

## 2019-01-30 NOTE — ASSESSMENT & PLAN NOTE
Admit to Inpatient   Tamiflu 75 mg BID   Supplemental O 2, keep sats > 92%   Zosyn and Vancomycin given in the ED   BC X 2   Tylenol as needed for fever   Droplet precautions

## 2019-01-30 NOTE — PLAN OF CARE
Pt in bed AAOx4.   Plan of Care reviewed, Verbalized understanding.  Patient remained free from falls, fall precautions in place.   Pt is NSR on monitor. VSS.   PIV CDI.   Droplet precautions maintained  Avasys in place due to patient attempting to get out of bed.   Call bell and personal belongings within reach.   Hourly rounding complete. Reminded to call for assistance.   No complaints at this time.   Will continue to monitor.

## 2019-01-31 VITALS
HEIGHT: 72 IN | DIASTOLIC BLOOD PRESSURE: 63 MMHG | TEMPERATURE: 98 F | OXYGEN SATURATION: 95 % | HEART RATE: 68 BPM | SYSTOLIC BLOOD PRESSURE: 144 MMHG | WEIGHT: 169.31 LBS | BODY MASS INDEX: 22.93 KG/M2 | RESPIRATION RATE: 18 BRPM

## 2019-01-31 PROBLEM — I50.9 ACUTE CONGESTIVE HEART FAILURE: Status: RESOLVED | Noted: 2019-01-29 | Resolved: 2019-01-31

## 2019-01-31 PROBLEM — J96.01 ACUTE RESPIRATORY FAILURE WITH HYPOXIA: Status: RESOLVED | Noted: 2019-01-29 | Resolved: 2019-01-31

## 2019-01-31 PROBLEM — J10.1 INFLUENZA A: Status: RESOLVED | Noted: 2019-01-29 | Resolved: 2019-01-31

## 2019-01-31 LAB
ALBUMIN SERPL BCP-MCNC: 2.9 G/DL
ALP SERPL-CCNC: 71 U/L
ALT SERPL W/O P-5'-P-CCNC: 18 U/L
ANION GAP SERPL CALC-SCNC: 13 MMOL/L
AST SERPL-CCNC: 38 U/L
BASOPHILS # BLD AUTO: 0 K/UL
BASOPHILS NFR BLD: 0 %
BILIRUB SERPL-MCNC: 1 MG/DL
BUN SERPL-MCNC: 29 MG/DL
CALCIUM SERPL-MCNC: 9.3 MG/DL
CHLORIDE SERPL-SCNC: 102 MMOL/L
CO2 SERPL-SCNC: 24 MMOL/L
CREAT SERPL-MCNC: 2.3 MG/DL
DIFFERENTIAL METHOD: ABNORMAL
EOSINOPHIL # BLD AUTO: 0 K/UL
EOSINOPHIL NFR BLD: 0 %
ERYTHROCYTE [DISTWIDTH] IN BLOOD BY AUTOMATED COUNT: 13.7 %
EST. GFR  (AFRICAN AMERICAN): 29 ML/MIN/1.73 M^2
EST. GFR  (NON AFRICAN AMERICAN): 25 ML/MIN/1.73 M^2
GLUCOSE SERPL-MCNC: 139 MG/DL
HCT VFR BLD AUTO: 31.9 %
HGB BLD-MCNC: 10.9 G/DL
LYMPHOCYTES # BLD AUTO: 0.5 K/UL
LYMPHOCYTES NFR BLD: 7 %
MCH RBC QN AUTO: 29.4 PG
MCHC RBC AUTO-ENTMCNC: 34.2 G/DL
MCV RBC AUTO: 86 FL
MONOCYTES # BLD AUTO: 0.6 K/UL
MONOCYTES NFR BLD: 8.7 %
NEUTROPHILS # BLD AUTO: 5.6 K/UL
NEUTROPHILS NFR BLD: 84.3 %
PLATELET # BLD AUTO: 190 K/UL
PMV BLD AUTO: 10.3 FL
POTASSIUM SERPL-SCNC: 3.8 MMOL/L
PROT SERPL-MCNC: 7.1 G/DL
RBC # BLD AUTO: 3.71 M/UL
SODIUM SERPL-SCNC: 139 MMOL/L
WBC # BLD AUTO: 6.69 K/UL

## 2019-01-31 PROCEDURE — 36415 COLL VENOUS BLD VENIPUNCTURE: CPT

## 2019-01-31 PROCEDURE — 63600175 PHARM REV CODE 636 W HCPCS: Performed by: EMERGENCY MEDICINE

## 2019-01-31 PROCEDURE — 94761 N-INVAS EAR/PLS OXIMETRY MLT: CPT

## 2019-01-31 PROCEDURE — 97530 THERAPEUTIC ACTIVITIES: CPT

## 2019-01-31 PROCEDURE — 25000242 PHARM REV CODE 250 ALT 637 W/ HCPCS: Performed by: NURSE PRACTITIONER

## 2019-01-31 PROCEDURE — 25000003 PHARM REV CODE 250: Performed by: NURSE PRACTITIONER

## 2019-01-31 PROCEDURE — 25000003 PHARM REV CODE 250: Performed by: PHYSICIAN ASSISTANT

## 2019-01-31 PROCEDURE — 97535 SELF CARE MNGMENT TRAINING: CPT

## 2019-01-31 PROCEDURE — 85025 COMPLETE CBC W/AUTO DIFF WBC: CPT

## 2019-01-31 PROCEDURE — 94640 AIRWAY INHALATION TREATMENT: CPT

## 2019-01-31 PROCEDURE — 80053 COMPREHEN METABOLIC PANEL: CPT

## 2019-01-31 RX ORDER — OSELTAMIVIR PHOSPHATE 6 MG/ML
30 FOR SUSPENSION ORAL 2 TIMES DAILY
Qty: 50 ML | Refills: 0 | Status: SHIPPED | OUTPATIENT
Start: 2019-01-31 | End: 2019-02-04 | Stop reason: ALTCHOICE

## 2019-01-31 RX ORDER — PREDNISONE 20 MG/1
TABLET ORAL
Qty: 20 TABLET | Refills: 0 | Status: SHIPPED | OUTPATIENT
Start: 2019-01-31 | End: 2019-02-05

## 2019-01-31 RX ORDER — FUROSEMIDE 40 MG/1
40 TABLET ORAL DAILY PRN
Qty: 30 TABLET | Refills: 1 | Status: SHIPPED | OUTPATIENT
Start: 2019-01-31 | End: 2020-01-31

## 2019-01-31 RX ADMIN — AMLODIPINE BESYLATE 10 MG: 10 TABLET ORAL at 10:01

## 2019-01-31 RX ADMIN — METOPROLOL SUCCINATE 25 MG: 25 TABLET, EXTENDED RELEASE ORAL at 10:01

## 2019-01-31 RX ADMIN — IPRATROPIUM BROMIDE AND ALBUTEROL SULFATE 3 ML: .5; 3 SOLUTION RESPIRATORY (INHALATION) at 12:01

## 2019-01-31 RX ADMIN — TAMSULOSIN HYDROCHLORIDE 0.4 MG: 0.4 CAPSULE ORAL at 10:01

## 2019-01-31 RX ADMIN — IPRATROPIUM BROMIDE AND ALBUTEROL SULFATE 3 ML: .5; 3 SOLUTION RESPIRATORY (INHALATION) at 01:01

## 2019-01-31 RX ADMIN — PREDNISONE 20 MG: 20 TABLET ORAL at 10:01

## 2019-01-31 RX ADMIN — OSELTAMIVIR PHOSPHATE 30 MG: 6 POWDER, FOR SUSPENSION ORAL at 09:01

## 2019-01-31 RX ADMIN — IPRATROPIUM BROMIDE AND ALBUTEROL SULFATE 3 ML: .5; 3 SOLUTION RESPIRATORY (INHALATION) at 08:01

## 2019-01-31 RX ADMIN — ASPIRIN 81 MG CHEWABLE TABLET 81 MG: 81 TABLET CHEWABLE at 10:01

## 2019-01-31 NOTE — PT/OT/SLP PROGRESS
"Occupational Therapy  Treatment    Tye Qiu   MRN: [id number]   Admitting Diagnosis: Acute respiratory failure with hypoxia    OT Date of Treatment: 01/31/19   OT Start Time: 0805  OT Stop Time: 0825  OT Total Time (min): 20 min    Billable Minutes:  Self Care/Home Management  x 20 min    General Precautions: Standard, droplet, fall  Orthopedic Precautions: N/A  Braces: N/A         Subjective:  Communicated with pt, and nurse prior to session.    Pain/Comfort  Pain Rating 1: 0/10    Objective:  Patient found with: peripheral IV, oxygen, buenrostro catheter     Functional Mobility:  Bed Mobility:       Transfers:        Functional Ambulation: fx ambulation with sba from bed to sink, static standing with sba at front of sink to wash face and hands.     Activities of Daily Living:     Feeding adaptive equipment:      UE adaptive equipment:      LE adaptive equipment:                     Bathing adaptive equipment:     Balance:   Static Sit: FAIR: Maintains without assist, but unable to take any challenges   Dynamic Sit:   Static Stand:   Dynamic stand:     Therapeutic Activities and Exercises:  Pt performed bed mobility with spv for supine to sit, sit to supine, refused any therapeutic ex to BUE's, seems a little agitaited this am, request to return to bed.  AM-PAC 6 CLICK ADL   How much help from another person does this patient currently need?   1 = Unable, Total/Dependent Assistance  2 = A lot, Maximum/Moderate Assistance  3 = A little, Minimum/Contact Guard/Supervision  4 = None, Modified [place]/Independent    Putting on and taking off regular lower body clothing? : 3  Bathing (including washing, rinsing, drying)?: 3  Toileting, which includes using toilet, bedpan, or urinal? : 3  Putting on and taking off regular upper body clothing?: 3  Taking care of personal grooming such as brushing teeth?: 4  Eating meals?: 4  Daily Activity Total Score: 20     AM-PAC Raw Score CMS "G-Code Modifier Level of Impairment " Assistance   6 % Total / Unable   7 - 8 CM 80 - 100% Maximal Assist   9-13 CL 60 - 80% Moderate Assist   14 - 19 CK 40 - 60% Moderate Assist   20 - 22 CJ 20 - 40% Minimal Assist   23 CI 1-20% SBA / CGA   24 CH 0% Independent/ Mod I       Patient left supine with all lines intact, call button in reach, bed alarm off and nurse notified    ASSESSMENT:  Tye Qiu is a 83 y.o. male with a medical diagnosis of Acute respiratory failure with hypoxia and presents with impaired self care skills and fx mobility.    Rehab identified problem list/impairments: Rehab identified problem list/impairments: weakness, impaired endurance, impaired functional mobilty, impaired self care skills, impaired balance, decreased coordination, decreased safety awareness    Rehab potential is good.    Activity tolerance: Fair    Discharge recommendations: Discharge Facility/Level of Care Needs: other (see comments)(home with HH )     Barriers to discharge: Barriers to Discharge: None    Equipment recommendations: none     GOALS:   Multidisciplinary Problems     Occupational Therapy Goals        Problem: Occupational Therapy Goal    Goal Priority Disciplines Outcome Interventions   Occupational Therapy Goal     OT, PT/OT Ongoing (interventions implemented as appropriate)    Description:  OT GOAL TO BE MET 2-6-19  S WITH UE DRESSING  S WITH LE DRESSING  PT WILL TOLERATE 1 SET X 15 REPS B UE ROM EXERCISE WITH MIN RESISTANCE                      Plan:  Patient to be seen 3 x/week to address the above listed problems via self-care/home management, therapeutic activities, therapeutic exercises  Plan of Care expires: 02/06/19  Plan of Care reviewed with: patient         Alondra MckeonCecelia, OT  01/31/2019

## 2019-01-31 NOTE — PT/OT/SLP PROGRESS
Physical Therapy  Treatment    Tye Qiu   MRN: [id number]   Admitting Diagnosis: Acute respiratory failure with hypoxia    PT Received On: 01/31/19  PT Start Time: 0835     PT Stop Time: 0845    PT Total Time (min): 10 min       Billable Minutes:  Therapeutic Activity 10    Treatment Type: Treatment  PT/PTA: PT             General Precautions: Standard, droplet, fall  Orthopedic Precautions: N/A   Braces: N/A         Subjective:  Communicated with NURSE PHILLIPS AND Epic CHART REVIEW prior to session.  PT AGREED TO MIN TX WITH INC ENCOURAGEMENT    Pain/Comfort  Pain Rating 1: 0/10  Pain Rating Post-Intervention 1: 0/10    Objective:   Patient found with: peripheral IV, oxygen, buenrostro catheter    Functional Mobility:  PT MET IN RM SUP IN BED. PT LOG ROLLED TO LEFT AND SEATED EOB WITH MIN A. PT SCOOTED TO EOB AND COMPLETE B LE TE X 10 REPS OF TKE . PT REFUSED TO CONTINUE. PT STOOD WITH CGA AND GTT RAINED X 3 STEPS TO SINK WITH CGA. PT STOOD FOR GROOMING. PT RETURNED SEATED EOB AS PT REFUSED TO CONT TX. PT SUP IN BED WITH BED ALARM ON AND ALL NEEDS MET.     AM-PAC 6 CLICK MOBILITY  How much help from another person does this patient currently need?   1 = Unable, Total/Dependent Assistance  2 = A lot, Maximum/Moderate Assistance  3 = A little, Minimum/Contact Guard/Supervision  4 = None, Modified [place]/Independent    Turning over in bed (including adjusting bedclothes, sheets and blankets)?: 3  Sitting down on and standing up from a chair with arms (e.g., wheelchair, bedside commode, etc.): 3  Moving from lying on back to sitting on the side of the bed?: 3  Moving to and from a bed to a chair (including a wheelchair)?: 3  Need to walk in hospital room?: 3  Climbing 3-5 steps with a railing?: 1  Basic Mobility Total Score: 16    AM-PAC Raw Score CMS G-Code Modifier Level of Impairment Assistance   6 % Total / Unable   7 - 9 CM 80 - 100% Maximal Assist   10 - 14 CL 60 - 80% Moderate Assist   15 - 19 CK 40 -  60% Moderate Assist   20 - 22 CJ 20 - 40% Minimal Assist   23 CI 1-20% SBA / CGA   24 CH 0% Independent/ Mod I     Patient left supine with call button in reach and bed alarm on.    Assessment:  PT TONJA MIN TX.     Rehab identified problem list/impairments: Rehab identified problem list/impairments: weakness, impaired endurance, impaired functional mobilty, gait instability, decreased safety awareness, impaired self care skills, impaired balance, decreased lower extremity function, decreased ROM    Rehab potential is good.    Activity tolerance: Poor    Discharge recommendations: Discharge Facility/Level of Care Needs: (HH P.T. )     Barriers to discharge:      Equipment recommendations: Equipment Needed After Discharge: walker, rolling     GOALS:   Multidisciplinary Problems     Physical Therapy Goals        Problem: Physical Therapy Goal    Goal Priority Disciplines Outcome Goal Variances Interventions   Physical Therapy Goal     PT, PT/OT Ongoing (interventions implemented as appropriate)     Description:  PT WILL BE SEEN FOR P.T. FOR A MIN OF 5 OUT OF 7 DAYS A WEEK  LT19  1. PT WILL COMPLETE BED MOBILITY IND  2. PT WILL GT TRAIN X 150' WITH S  3. PT WILL T/F TO CHAIR WITH S  4. PT WILL COMPLETE B LE TE X 20 REPS                    PLAN:    Patient to be seen to address the above listed problems via gait training, therapeutic activities, therapeutic exercises  Plan of Care expires: 19  Plan of Care reviewed with: patient    Zuly Muniz, PT  2019

## 2019-01-31 NOTE — SUBJECTIVE & OBJECTIVE
Interval History: Feels a little better, cough, SOB improved, ate well, but on exam still has some wheezing B, prednisone added. Put out about 2-3 L so far, he is on Lasix 40 bid.    Review of Systems   Constitutional: Positive for fatigue. Negative for chills, diaphoresis and fever.   HENT: Negative for congestion, ear discharge, rhinorrhea, sinus pressure, sore throat and trouble swallowing.    Eyes: Negative for discharge and visual disturbance.   Respiratory: Positive for shortness of breath. Negative for apnea, cough, choking, chest tightness, wheezing and stridor.    Cardiovascular: Negative for chest pain, palpitations and leg swelling.   Gastrointestinal: Negative for abdominal distention, abdominal pain, diarrhea, nausea and vomiting.   Endocrine: Negative for cold intolerance and heat intolerance.   Genitourinary: Negative for dysuria, frequency and hematuria.        Urine retention    Musculoskeletal: Negative for arthralgias, back pain, myalgias and neck pain.   Skin: Negative for pallor and rash.   Neurological: Positive for weakness. Negative for dizziness, seizures, syncope and headaches.   Psychiatric/Behavioral: Negative for agitation, confusion and sleep disturbance.     Objective:     Vital Signs (Most Recent):  Temp: 98.9 °F (37.2 °C) (01/30/19 0810)  Pulse: 75 (01/30/19 1550)  Resp: 18 (01/30/19 1529)  BP: (!) 142/63 (01/30/19 1529)  SpO2: 96 % (01/30/19 1529) Vital Signs (24h Range):  Temp:  [97.8 °F (36.6 °C)-99.2 °F (37.3 °C)] 98.9 °F (37.2 °C)  Pulse:  [65-84] 75  Resp:  [18-24] 18  SpO2:  [94 %-99 %] 96 %  BP: (132-178)/(61-79) 142/63     Weight: 81.3 kg (179 lb 3.7 oz)  Body mass index is 24.31 kg/m².    Intake/Output Summary (Last 24 hours) at 1/30/2019 0368  Last data filed at 1/30/2019 1800  Gross per 24 hour   Intake 740 ml   Output 4125 ml   Net -3385 ml      Physical Exam   Constitutional: He appears cachectic. He has a sickly appearance. No distress.   HENT:   Head: Normocephalic  and atraumatic.   Neck: No JVD present.   Cardiovascular: Exam reveals no gallop and no friction rub.   No murmur heard.  Pulmonary/Chest: No stridor. No respiratory distress. He has no wheezes. He has rhonchi. He has no rales.   Abdominal: He exhibits no distension and no mass. There is no tenderness. There is no rebound and no guarding. No hernia.   Musculoskeletal: He exhibits no edema or deformity.   Neurological: He is alert.   Skin: Skin is dry. Capillary refill takes less than 2 seconds. He is not diaphoretic. No erythema.   Nursing note and vitals reviewed.      Significant Labs:   BMP:   Recent Labs   Lab 01/30/19  1057   *      K 3.6      CO2 22*   BUN 24*   CREATININE 2.3*   CALCIUM 9.0     CBC:   Recent Labs   Lab 01/29/19  1508 01/30/19  1057   WBC 7.84 7.35   HGB 10.3* 10.4*   HCT 30.3* 30.6*    165     All pertinent labs within the past 24 hours have been reviewed.    Significant Imaging: I have reviewed all pertinent imaging results/findings within the past 24 hours.

## 2019-01-31 NOTE — ASSESSMENT & PLAN NOTE
Admit to Inpatient   Tamiflu 75 mg BID   Supplemental O 2, keep sats > 92%   Zosyn and Vancomycin given in the ED   BC X 2   Tylenol as needed for fever   Droplet precautions     Continue Tamiflu

## 2019-01-31 NOTE — PROGRESS NOTES
[facility] Hospital Medicine  Progress Note    Patient Name: Tye Qiu  MRN: [id number]  Patient Class: IP- Inpatient   Admission Date: 1/29/2019  Length of Stay: 1 days  Attending Physician: Chidi Jang MD  Primary Care Provider: PERRI Vee MD        Subjective:     Principal Problem:Acute respiratory failure with hypoxia    HPI:  Mr Qiu is a 83 year old female with PMHx of HTN, BPH and urinary retention who presented to the Emergency Room with complaints of shortness of breath that started about 4 days ago. Associated symptoms include fever, chills and chest congestion. Denies associated symptoms of chest pain, palpitations, diaphoresis, LOC or dizziness. O 2 sat 88 % on room air in the Emergency Room. Positive for influenza A. Temp 102.4. BNP 1011. Chest X ray showes interstitial opacities are seen scattered throughout the right mid lower lung zone as well as left lower lobe which could reflect interstitial edema or pneumonia. Wife reports he get In & out cath twice daily for urinary retention.  Patient is a full code, wife, Soha Qiu, is surrogate decision maker. He is being admitted under the care of [facility] medicine.     Hospital Course:  Feels a little better, cough, SOB improved, ate well, but on exam still has some wheezing B, prednisone added. Put out about 2-3 L so far, he is on Lasix 40 bid.    Interval History: Feels a little better, cough, SOB improved, ate well, but on exam still has some wheezing B, prednisone added. Put out about 2-3 L so far, he is on Lasix 40 bid.    Review of Systems   Constitutional: Positive for fatigue. Negative for chills, diaphoresis and fever.   HENT: Negative for congestion, ear discharge, rhinorrhea, sinus pressure, sore throat and trouble swallowing.    Eyes: Negative for discharge and visual disturbance.   Respiratory: Positive for shortness of breath. Negative for apnea, cough, choking, chest tightness, wheezing and stridor.     Cardiovascular: Negative for chest pain, palpitations and leg swelling.   Gastrointestinal: Negative for abdominal distention, abdominal pain, diarrhea, nausea and vomiting.   Endocrine: Negative for cold intolerance and heat intolerance.   Genitourinary: Negative for dysuria, frequency and hematuria.        Urine retention    Musculoskeletal: Negative for arthralgias, back pain, myalgias and neck pain.   Skin: Negative for pallor and rash.   Neurological: Positive for weakness. Negative for dizziness, seizures, syncope and headaches.   Psychiatric/Behavioral: Negative for agitation, confusion and sleep disturbance.     Objective:     Vital Signs (Most Recent):  Temp: 98.9 °F (37.2 °C) (01/30/19 0810)  Pulse: 75 (01/30/19 1550)  Resp: 18 (01/30/19 1529)  BP: (!) 142/63 (01/30/19 1529)  SpO2: 96 % (01/30/19 1529) Vital Signs (24h Range):  Temp:  [97.8 °F (36.6 °C)-99.2 °F (37.3 °C)] 98.9 °F (37.2 °C)  Pulse:  [65-84] 75  Resp:  [18-24] 18  SpO2:  [94 %-99 %] 96 %  BP: (132-178)/(61-79) 142/63     Weight: 81.3 kg (179 lb 3.7 oz)  Body mass index is 24.31 kg/m².    Intake/Output Summary (Last 24 hours) at 1/30/2019 1858  Last data filed at 1/30/2019 1800  Gross per 24 hour   Intake 740 ml   Output 4125 ml   Net -3385 ml      Physical Exam   Constitutional: He appears cachectic. He has a sickly appearance. No distress.   HENT:   Head: Normocephalic and atraumatic.   Neck: No JVD present.   Cardiovascular: Exam reveals no gallop and no friction rub.   No murmur heard.  Pulmonary/Chest: No stridor. No respiratory distress. He has no wheezes. He has rhonchi. He has no rales.   Abdominal: He exhibits no distension and no mass. There is no tenderness. There is no rebound and no guarding. No hernia.   Musculoskeletal: He exhibits no edema or deformity.   Neurological: He is alert.   Skin: Skin is dry. Capillary refill takes less than 2 seconds. He is not diaphoretic. No erythema.   Nursing note and vitals  reviewed.      Significant Labs:   BMP:   Recent Labs   Lab 01/30/19  1057   *      K 3.6      CO2 22*   BUN 24*   CREATININE 2.3*   CALCIUM 9.0     CBC:   Recent Labs   Lab 01/29/19  1508 01/30/19  1057   WBC 7.84 7.35   HGB 10.3* 10.4*   HCT 30.3* 30.6*    165     All pertinent labs within the past 24 hours have been reviewed.    Significant Imaging: I have reviewed all pertinent imaging results/findings within the past 24 hours.    Assessment/Plan:      * Acute respiratory failure with hypoxia    Admit to Inpatient   O 2 sats 88 % on RA in the Emergency Room   Supplemental O 2 keeps > 92 %   Duo neb     Continue Lasix, prednisone added  Cx NGTD           Acute congestive heart failure    BMP 1101  Lasix 40 mg IV BID   Check 2 D Echo   Low sodium diet   Strict I & O  Daily weights   Fluid restriction        CKD (chronic kidney disease) stage 4, GFR 15-29 ml/min    Creatinine 2.4 on admit   No recent labs to compare  Monitor      Continue Lasix     Urinary retention    Wife reports pt gets In & Out Cath twice daily at home for urinary retention   Simon Cath   UA       BPH (benign prostatic hyperplasia)    Continue Flomax        Essential hypertension    Continue home BP medications        Influenza A    Admit to Inpatient   Tamiflu 75 mg BID   Supplemental O 2, keep sats > 92%   Zosyn and Vancomycin given in the ED   BC X 2   Tylenol as needed for fever   Droplet precautions     Continue Tamiflu               VTE Risk Mitigation (From admission, onward)        Ordered     IP VTE HIGH RISK PATIENT  Once      01/29/19 1601     Place sequential compression device  Until discontinued      01/29/19 1601              Chidi Jang MD  Department of Hospital Medicine   [facility]

## 2019-01-31 NOTE — PLAN OF CARE
Problem: Physical Therapy Goal  Goal: Physical Therapy Goal  PT WILL BE SEEN FOR P.T. FOR A MIN OF 5 OUT OF 7 DAYS A WEEK  LT19  1. PT WILL COMPLETE BED MOBILITY IND  2. PT WILL GT TRAIN X 150' WITH S  3. PT WILL T/F TO CHAIR WITH S  4. PT WILL COMPLETE B LE TE X 20 REPS   Outcome: Ongoing (interventions implemented as appropriate)  PT GT TRAINED X 3' WITH CGA AND REFUSED TO PROGRESS GT.

## 2019-01-31 NOTE — PLAN OF CARE
Pt in bed AAOx4.   Plan of Care reviewed, Verbalized understanding.  Patient remained free from falls, fall precautions in place.   Pt is NSR on monitor. VSS.   PIV CDI.   [employer] at patient bedside throughout the shift.   Simon draining to gravity with securing device in place CDI.  Call bell and personal belongings within reach.   Hourly rounding complete. Reminded to call for assistance.   No complaints at this time.   Will continue to monitor.

## 2019-01-31 NOTE — NURSING
Pt and pt wife received discharge instructions and she verbalized understanding of instructions. PT PIV's taken out and buenrostro cath removed. Pt self cath's at home. PT to be wheeled down to the front via wheelchair. Pt in stable condition.

## 2019-01-31 NOTE — DISCHARGE SUMMARY
[facility] Hospital Medicine  Discharge Summary      Patient Name: Tye Qiu  MRN: [id number]  Admission Date: 1/29/2019  Hospital Length of Stay: 2 days  Discharge Date and Time:  01/31/2019 12:11 PM  Attending Physician: Chidi Jang MD   Discharging Provider: Chidi Jang MD  Primary Care Provider: PERRI Vee MD      HPI:   Mr Qiu is a 83 year old female with PMHx of HTN, BPH and urinary retention who presented to the Emergency Room with complaints of shortness of breath that started about 4 days ago. Associated symptoms include fever, chills and chest congestion. Denies associated symptoms of chest pain, palpitations, diaphoresis, LOC or dizziness. O 2 sat 88 % on room air in the Emergency Room. Positive for influenza A. Temp 102.4. BNP 1011. Chest X ray showes interstitial opacities are seen scattered throughout the right mid lower lung zone as well as left lower lobe which could reflect interstitial edema or pneumonia. Wife reports he get In & out cath twice daily for urinary retention.  Patient is a full code, wife, Soha Qiu, is surrogate decision maker. He is being admitted under the care of [facility] medicine.     * No surgery found *      Hospital Course:   Feels a little better, cough, SOB improved, ate well, but on exam still has some wheezing B, prednisone added. Put out about 2-3 L so far, he is on Lasix 40 bid. He is also on Tamiflu 30 BID for 5 days. Pt responded well to the Lasix and Prednisone and feels lot better, cough, SOB improved, he is eating drinking well, walking around well without any assistance. His CXR confirmed CHF and on inquiry, his wife confirmed that pt eats lot of salt and chips and drinks plenty of water/ fluid. His echo showed normal EF and LVF, no DD either. Pt is a poor historian, he and his wife were counseled about fluid and salt restriction since he has CKD 4 (29/2.3) and he cannot continue to consume lot of fluids or salt. He will also finish  his course of Tamiflu and take prednisone for 5 days more. He was seen and examined and deemed stable for discharge home today.      Consults:     No new Assessment & Plan notes have been filed under this hospital service since the last note was generated.  Service: Hospital Medicine    Final Active Diagnoses:    Diagnosis Date Noted POA    Essential hypertension [I10] 01/29/2019 Yes    BPH (benign prostatic hyperplasia) [N40.0] 01/29/2019 Yes    Urinary retention [R33.9] 01/29/2019 Yes    CKD (chronic kidney disease) stage 4, GFR 15-29 ml/min [N18.4] 01/29/2019 Yes      Problems Resolved During this Admission:    Diagnosis Date Noted Date Resolved POA    PRINCIPAL PROBLEM:  Acute respiratory failure with hypoxia [J96.01] 01/29/2019 01/31/2019 Yes    Influenza A [J10.1] 01/29/2019 01/31/2019 Yes    Acute congestive heart failure [I50.9] 01/29/2019 01/31/2019 Yes       Discharged Condition: stable    Disposition: Home or Self Care    Follow Up:  Follow-up Information     Schedule an appointment as soon as possible for a visit with Saima Gomes PA-C.    Specialty:  Cardiology  Contact information:  [address] [place] [address] DR Kyra JARQUIN [address] [phone]                 Patient Instructions:      Diet Cardiac   Order Comments: No salt, fluid restricted to 1.5 L daily- (no more than 6 8 oz glasses or cups daily)     Activity as tolerated       Significant Diagnostic Studies: Labs:   BMP:   Recent Labs   Lab 01/29/19  1508 01/30/19  1057 01/31/19  0441   * 145* 139*    140 139   K 4.1 3.6 3.8    106 102   CO2 21* 22* 24   BUN 21 24* 29*   CREATININE 2.4* 2.3* 2.3*   CALCIUM 9.2 9.0 9.3   , CMP   Recent Labs   Lab 01/29/19  1508 01/30/19  1057 01/31/19  0441    140 139   K 4.1 3.6 3.8    106 102   CO2 21* 22* 24   * 145* 139*   BUN 21 24* 29*   CREATININE 2.4* 2.3* 2.3*   CALCIUM 9.2 9.0 9.3   PROT 7.2 6.8 7.1   ALBUMIN 3.4* 3.0* 2.9*   BILITOT 1.3* 1.2* 1.0   ALKPHOS  79 69 71   AST 34 35 38   ALT 11 13 18   ANIONGAP 11 12 13   ESTGFRAFRICA 28* 29* 29*   EGFRNONAA 24* 25* 25*   , CBC   Recent Labs   Lab 01/29/19  1508 01/30/19  1057 01/31/19  0441   WBC 7.84 7.35 6.69   HGB 10.3* 10.4* 10.9*   HCT 30.3* 30.6* 31.9*    165 190   All labs within the past 24 hours have been reviewed  Microbiology:   Blood Culture   Lab Results   Component Value Date    LABBLOO No Growth to date 01/29/2019    LABBLOO No Growth to date 01/29/2019    and Sputum Culture No results found for: GSRESP, RESPIRATORYC  Radiology: X-Ray Chest PA and Lateral (CXR):   Results for orders placed or performed during the hospital encounter of 01/29/19   X-Ray Chest PA And Lateral    Narrative    EXAMINATION:  XR CHEST PA AND LATERAL    CLINICAL HISTORY:  f/u pneumonia vs chf;    COMPARISON:  01/29/2019    FINDINGS:  Cardiac silhouette is enlarged but stable.  Pulmonary vascular congestion and basilar interstitial edema suspected.  Trace right and small left pleural effusion suspected.  The lungs demonstrate no evidence of active disease.  No pneumothorax..  Bones demonstrate degenerative changes.  Aorta demonstrates atherosclerotic disease.      Impression    See findings above.  Findings are most consistent with CHF.      Electronically signed by: Barry Allen MD  Date:    01/31/2019  Time:    10:33     Cardiac Graphics: Echocardiogram:   2D echo with color flow doppler:   Results for orders placed or performed during the hospital encounter of 01/29/19   2D echo with color flow doppler   Result Value Ref Range    QEF 60 55 - 65    Mitral Valve Regurgitation MILD     Diastolic Dysfunction No     Est. PA Systolic Pressure 24.9     Tricuspid Valve Regurgitation MILD        Pending Diagnostic Studies:     Procedure Component Value Units Date/Time    CBC auto differential [[id number]]     Order Status:  Sent Lab Status:  No result     Specimen:  Blood     Comprehensive metabolic panel [[id number]]     Order  Status:  Sent Lab Status:  No result     Specimen:  Blood          Medications:   Reconciled Home Medications:      Medication List      START taking these medications    furosemide 40 MG tablet  Commonly known as:  LASIX  Take 1 tablet (40 mg total) by mouth daily as needed (SOB or leg edema).     oseltamivir 6 mg/mL Susr  Commonly known as:  TAMIFLU  Take 5 mLs (30 mg total) by mouth 2 (two) times daily. for 5 days     predniSONE 20 MG tablet  Commonly known as:  DELTASONE  1 tab twice daily x 5 days with food then stop        CONTINUE taking these medications    amLODIPine 10 MG tablet  Commonly known as:  NORVASC  Take 10 mg by mouth once daily.     aspirin 81 MG Chew  Take 81 mg by mouth every evening.     citalopram 10 MG tablet  Commonly known as:  CELEXA  Take 10 mg by mouth every evening.     metoprolol succinate 25 MG 24 hr tablet  Commonly known as:  TOPROL-XL  Take 25 mg by mouth once daily.     OLANZapine 2.5 MG tablet  Commonly known as:  ZyPREXA  Take 2.5 mg by mouth every evening.     tamsulosin 0.4 mg Cap  Commonly known as:  FLOMAX  Take 0.4 mg by mouth once daily.            Indwelling Lines/Drains at time of discharge:   Lines/Drains/Airways     Drain                 Urethral Catheter 01/29/19 5920 Double-lumen 16 Fr. 1 day                Time spent on the discharge of patient:  45 minutes  Patient was seen and examined on the date of discharge and determined to be suitable for discharge.         Chidi Jang MD  Department of Hospital Medicine  [facility]

## 2019-01-31 NOTE — PROGRESS NOTES
Home Oxygen Evaluation    Date Performed: 2019    1) Patient's Home O2 Sat on room air, while at rest: 97        If O2 sats on room air at rest are 88% or below, patient qualifies. No additional testing needed. Document N/A in steps 2 and 3. If 89% or above, complete steps 2.      2) Patient's O2 Sat on room air while exercisin        If O2 sats on room air while exercising remain 89% or above patient does not qualify, no further testing needed Document N/A in step 3. If O2 sats on room air while exercising are 88% or below, continue to step 3.      3) Patient's O2 Sat while exercising on O2: 96 at 3 LPM         (Must show improvement from #2 for patients to qualify)    If O2 sats improve on oxygen, patient qualifies for portable oxygen. If not, the patient does not qualify.

## 2019-01-31 NOTE — ASSESSMENT & PLAN NOTE
Admit to Inpatient   O 2 sats 88 % on RA in the Emergency Room   Supplemental O 2 keeps > 92 %   Duo neb     Continue Lasix, prednisone added  Cx NGTD

## 2019-01-31 NOTE — PLAN OF CARE
Problem: Adult Inpatient Plan of Care  Goal: Plan of Care Review  Outcome: Ongoing (interventions implemented as appropriate)  POC reviewed with patient, and pt spouse . Spouse on able to verbalize understanding. Pt remains free from falls. Fall precautions in place. Pt has bed alarm in use , Avasys in use and pt was moved closer to the nurses station do to being a fall risk. NS on cardiac monitor. VSS. No other complaints at this time. Call bell w/in reach. Reminded to call for assistance. PT has O2 in place and will occasionally remove so will have to reapply often. PT still on droplet precautions for positive flu.

## 2019-01-31 NOTE — HOSPITAL COURSE
Feels a little better, cough, SOB improved, ate well, but on exam still has some wheezing B, prednisone added. Put out about 2-3 L so far, he is on Lasix 40 bid. He is also on Tamiflu 30 BID for 5 days. Pt responded well to the Lasix and Prednisone and feels lot better, cough, SOB improved, he is eating drinking well, walking around well without any assistance. His CXR confirmed CHF and on inquiry, his wife confirmed that pt eats lot of salt and chips and drinks plenty of water/ fluid. His echo showed normal EF and LVF, no DD either. Pt is a poor historian, he and his wife were counseled about fluid and salt restriction since he has CKD 4 (29/2.3) and he cannot continue to consume lot of fluids or salt. He will also finish his course of Tamiflu and take prednisone for 5 days more. He was seen and examined and deemed stable for discharge home today. He did not qualify for Home O2.

## 2019-01-31 NOTE — PLAN OF CARE
01/31/19 1331   Medicare Message   Important Message from Medicare regarding Discharge Appeal Rights Given to patient/caregiver;Explained to patient/caregiver;Signed/date by patient/caregiver   Date IMM was signed 01/31/19   Time IMM was signed [id number]

## 2019-02-03 LAB
BACTERIA BLD CULT: NORMAL
BACTERIA BLD CULT: NORMAL

## 2019-02-04 ENCOUNTER — PATIENT OUTREACH (OUTPATIENT)
Dept: ADMINISTRATIVE | Facility: CLINIC | Age: 84
End: 2019-02-04

## 2019-02-04 NOTE — PATIENT INSTRUCTIONS

## 2019-02-05 ENCOUNTER — TELEPHONE (OUTPATIENT)
Dept: CARDIOLOGY | Facility: CLINIC | Age: 84
End: 2019-02-05

## 2019-02-05 ENCOUNTER — OFFICE VISIT (OUTPATIENT)
Dept: CARDIOLOGY | Facility: CLINIC | Age: 84
End: 2019-02-05
Payer: MEDICARE

## 2019-02-05 VITALS
SYSTOLIC BLOOD PRESSURE: 140 MMHG | WEIGHT: 165.56 LBS | HEART RATE: 52 BPM | DIASTOLIC BLOOD PRESSURE: 56 MMHG | BODY MASS INDEX: 22.43 KG/M2 | HEIGHT: 72 IN

## 2019-02-05 DIAGNOSIS — I50.31 ACUTE DIASTOLIC HEART FAILURE: Primary | ICD-10-CM

## 2019-02-05 DIAGNOSIS — J18.9 PNEUMONIA OF BOTH LOWER LOBES DUE TO INFECTIOUS ORGANISM: ICD-10-CM

## 2019-02-05 DIAGNOSIS — I10 ESSENTIAL HYPERTENSION: ICD-10-CM

## 2019-02-05 DIAGNOSIS — N18.4 CKD (CHRONIC KIDNEY DISEASE) STAGE 4, GFR 15-29 ML/MIN: ICD-10-CM

## 2019-02-05 DIAGNOSIS — R94.31 ABNORMAL ECG: ICD-10-CM

## 2019-02-05 DIAGNOSIS — I11.0 HYPERTENSIVE LEFT VENTRICULAR HYPERTROPHY WITH HEART FAILURE: ICD-10-CM

## 2019-02-05 PROCEDURE — 99999 PR PBB SHADOW E&M-EST. PATIENT-LVL III: CPT | Mod: PBBFAC,,, | Performed by: INTERNAL MEDICINE

## 2019-02-05 PROCEDURE — 99999 PR PBB SHADOW E&M-EST. PATIENT-LVL III: ICD-10-PCS | Mod: PBBFAC,,, | Performed by: INTERNAL MEDICINE

## 2019-02-05 PROCEDURE — 3077F PR MOST RECENT SYSTOLIC BLOOD PRESSURE >= 140 MM HG: ICD-10-PCS | Mod: CPTII,S$GLB,, | Performed by: INTERNAL MEDICINE

## 2019-02-05 PROCEDURE — 3078F DIAST BP <80 MM HG: CPT | Mod: CPTII,S$GLB,, | Performed by: INTERNAL MEDICINE

## 2019-02-05 PROCEDURE — 99204 PR OFFICE/OUTPT VISIT, NEW, LEVL IV, 45-59 MIN: ICD-10-PCS | Mod: S$GLB,,, | Performed by: INTERNAL MEDICINE

## 2019-02-05 PROCEDURE — 3077F SYST BP >= 140 MM HG: CPT | Mod: CPTII,S$GLB,, | Performed by: INTERNAL MEDICINE

## 2019-02-05 PROCEDURE — 99204 OFFICE O/P NEW MOD 45 MIN: CPT | Mod: S$GLB,,, | Performed by: INTERNAL MEDICINE

## 2019-02-05 PROCEDURE — 3078F PR MOST RECENT DIASTOLIC BLOOD PRESSURE < 80 MM HG: ICD-10-PCS | Mod: CPTII,S$GLB,, | Performed by: INTERNAL MEDICINE

## 2019-02-05 NOTE — PROGRESS NOTES
Subjective:    Patient ID:  Tye Qiu is a 83 y.o. male who presents for evaluation of Hypertension and Congestive Heart Failure      HPI Pt presents for CHF.  His current med conditions include HTN, CRI, diastolic CHF.  Pt admitted last week to [facility] for flu, pneumonia with associated diastolic CHF.  Pt had fever, + flu test.  D/c home on lasix prn and tamiflu which he finished.  bnp 1101.  Echo showed normal LVEF, LVH, LAE, mild MR, mild TR.  ECG showed NSR, 1 av block, possible old septal infarct.    Denies cp sxs.  Dyspnea seems back to normal.  + cough, improving.  BP stable.      Past Medical History:   Diagnosis Date    BPH (benign prostatic hyperplasia)     Hypertension        Current Outpatient Medications:     amLODIPine (NORVASC) 10 MG tablet, Take 10 mg by mouth once daily., Disp: , Rfl:     aspirin 81 MG Chew, Take 81 mg by mouth every evening. , Disp: , Rfl:     citalopram (CELEXA) 10 MG tablet, Take 10 mg by mouth every evening. , Disp: , Rfl:     furosemide (LASIX) 40 MG tablet, Take 1 tablet (40 mg total) by mouth daily as needed (SOB or leg edema)., Disp: 30 tablet, Rfl: 1    metoprolol succinate (TOPROL-XL) 25 MG 24 hr tablet, Take 25 mg by mouth once daily., Disp: , Rfl:     OLANZapine (ZYPREXA) 2.5 MG tablet, Take 2.5 mg by mouth every evening. , Disp: , Rfl:     tamsulosin (FLOMAX) 0.4 mg Cap, Take 0.4 mg by mouth once daily. , Disp: , Rfl:         Review of Systems   Constitution: Negative.   HENT: Negative.    Eyes: Negative.    Cardiovascular: Negative.    Respiratory: Positive for cough.    Endocrine: Negative.    Hematologic/Lymphatic: Negative.    Skin: Negative.    Musculoskeletal: Positive for arthritis.   Gastrointestinal: Negative.    Genitourinary: Negative.    Neurological: Negative.    Psychiatric/Behavioral: Negative.    Allergic/Immunologic: Negative.        BP (!) 140/56 (BP Location: Right arm, Patient Position: Sitting, BP Method: Medium (Manual))   Pulse (!) 52    Ht 6' (1.829 m)   Wt 75.1 kg (165 lb 9.1 oz)   BMI 22.45 kg/m²     Wt Readings from Last 3 Encounters:   02/05/19 75.1 kg (165 lb 9.1 oz)   01/31/19 76.8 kg (169 lb 5 oz)     Temp Readings from Last 3 Encounters:   01/31/19 98.4 °F (36.9 °C) (Oral)     BP Readings from Last 3 Encounters:   02/05/19 (!) 140/56   01/31/19 (!) 144/63     Pulse Readings from Last 3 Encounters:   02/05/19 (!) 52   01/31/19 68          Objective:    Physical Exam   Constitutional: He is oriented to person, place, and time. He appears well-developed and well-nourished.   HENT:   Head: Normocephalic.   Neck: Normal range of motion. Neck supple. Normal carotid pulses, no hepatojugular reflux and no JVD present. Carotid bruit is not present. No thyromegaly present.   Cardiovascular: Normal rate, regular rhythm, S1 normal and S2 normal. PMI is not displaced. Exam reveals no S3, no S4, no distant heart sounds, no friction rub, no midsystolic click and no opening snap.   No murmur heard.  Pulses:       Radial pulses are 2+ on the right side, and 2+ on the left side.   Pulmonary/Chest: Effort normal and breath sounds normal. He has no wheezes. He has no rales.   Abdominal: Soft. Bowel sounds are normal. He exhibits no distension, no abdominal bruit, no ascites and no mass. There is no tenderness.   Musculoskeletal: He exhibits no edema.   Neurological: He is alert and oriented to person, place, and time.   Skin: Skin is warm.   Psychiatric: He has a normal mood and affect. His behavior is normal.   Nursing note and vitals reviewed.      I have reviewed all pertinent labs and cardiac studies.      Chemistry        Component Value Date/Time     01/31/2019 0441    K 3.8 01/31/2019 0441     01/31/2019 0441    CO2 24 01/31/2019 0441    BUN 29 (H) 01/31/2019 0441    CREATININE 2.3 (H) 01/31/2019 0441     (H) 01/31/2019 0441        Component Value Date/Time    CALCIUM 9.3 01/31/2019 0441    ALKPHOS 71 01/31/2019 0441    AST 38  01/31/2019 0441    ALT 18 01/31/2019 0441    BILITOT 1.0 01/31/2019 0441    ESTGFRAFRICA 29 (A) 01/31/2019 0441    EGFRNONAA 25 (A) 01/31/2019 0441        Lab Results   Component Value Date    WBC 6.69 01/31/2019    HGB 10.9 (L) 01/31/2019    HCT 31.9 (L) 01/31/2019    MCV 86 01/31/2019     01/31/2019     No results found for: LABA1C, HGBA1C  No results found for: CHOL  No results found for: HDL  No results found for: LDLCALC  No results found for: TRIG  No results found for: CHOLHDL    Final result   Visible to patient:  No (Not Released) Next appt:  None Dx:  CHF (congestive heart failure)   Details        Narrative     Date of Procedure: 01/30/2019        TEST DESCRIPTION   Technical Quality: This is a technically challenging study. There is poor endocardial definition.     Aorta: The aortic root is normal in size, measuring 3.1 cm at sinotubular junction and 3.2 cm at Sinuses of Valsalva. The proximal ascending aorta is normal in size, measuring 3.1 cm across.     Left Atrium: The left atrial volume index is moderately enlarged, measuring 44.06 cc/m2.     Left Ventricle: The left ventricle is normal in size, with an end-diastolic diameter of 4.0 cm, and an end-systolic diameter of 2.6 cm. Wall thickness is increased, with the septum and the posterior wall each measuring 1.5 cm across. Relative wall   thickness was increased at 0.75, and the LV mass index was increased at 136.2 g/m2 consistent with concentric left ventricular hypertrophy. There are no regional wall motion abnormalities. Left ventricular systolic function appears normal. Visually   estimated ejection fraction is 60-65%. The LV Doppler derived stroke volume equals 99.0 ccs.     Diastolic indices: E wave velocity 1.0 m/s, E/A ratio 2.1,  msec., E/e' ratio(avg) 8. Diastolic function is normal.     Right Atrium: The right atrium is normal in size, measuring 4.9 cm in length and 3.1 cm in width in the apical view.     Right Ventricle: The  right ventricle is normal in size. Global right ventricular systolic function appears normal. Tricuspid annular plane systolic excursion (TAPSE) is 1.7 cm. The estimated PA systolic pressure is 25 mmHg.     Aortic Valve:  The aortic valve is mildly sclerotic with normal leaflet mobility. The mean gradient obtained across the aortic valve is 6 mmHg.     Mitral Valve:  The mitral valve is normal in structure. The pressure half time is 73 msec. The calculated mitral valve area is 3.01 cm2. There is mild mitral regurgitation.     Tricuspid Valve:  The tricuspid valve is normal in structure. There is mild tricuspid regurgitation.     Pulmonary Valve:  The pulmonic valve is not well seen.     IVC: IVC is normal in size and collapses > 50% with a sniff, suggesting normal right atrial pressure of 3 mmHg.     Intracavitary: There is no evidence of pericardial effusion, intracavity mass, thrombi, or vegetation.         CONCLUSIONS     1 - Normal left ventricular systolic function (EF 60-65%).     2 - Normal left ventricular diastolic function.     3 - Normal right ventricular systolic function .             This document has been electronically    SIGNED BY: Edwardo Barajas MD On: 01/30/2019 12:24                     Assessment:       1. Acute diastolic heart failure    2. Essential hypertension    3. Pneumonia of both lower lobes due to infectious organism    4. CKD (chronic kidney disease) stage 4, GFR 15-29 ml/min    5. Hypertensive left ventricular hypertrophy with heart failure    6. Abnormal ECG         Plan:             S/p flu with subsequent acute diastolic CHF exacerbation, now seemingly remarkably improved.  Pt counseled on diastolic CHF mgt, low salt diet.  Lasix prn ok.  Schedule routine Nephrology consultation for CRI.  Recheck CMP, BNP in 1 - 2 weeks.  Stress MPI  Phone review for results.

## 2021-05-18 NOTE — PROGRESS NOTES
Keerthi Bauer RN attempted to contact patient. The following occurred:   C3 nurse attempted to contact Tye Qiu for a TCC post hospital discharge follow up call. The patient is unable to conduct the call @ this time. The patient requested a callback.    The patient has a scheduled HOSFU / CARDIOFU appointment with Saima Gomes on 2/5/19 @ 1600hes. Message not sent to Physician staff.          yes